# Patient Record
Sex: FEMALE | Race: BLACK OR AFRICAN AMERICAN | Employment: FULL TIME | URBAN - METROPOLITAN AREA
[De-identification: names, ages, dates, MRNs, and addresses within clinical notes are randomized per-mention and may not be internally consistent; named-entity substitution may affect disease eponyms.]

---

## 2018-04-02 ENCOUNTER — HOSPITAL ENCOUNTER (EMERGENCY)
Facility: HOSPITAL | Age: 53
Discharge: HOME | End: 2018-04-03
Attending: EMERGENCY MEDICINE
Payer: COMMERCIAL

## 2018-04-02 ENCOUNTER — APPOINTMENT (OUTPATIENT)
Dept: ULTRASOUND IMAGING | Facility: HOSPITAL | Age: 53
End: 2018-04-02
Attending: EMERGENCY MEDICINE
Payer: COMMERCIAL

## 2018-04-02 DIAGNOSIS — R55 VASOVAGAL REACTION: ICD-10-CM

## 2018-04-02 DIAGNOSIS — K80.50 BILIARY COLIC: Primary | ICD-10-CM

## 2018-04-02 PROCEDURE — 80053 COMPREHEN METABOLIC PANEL: CPT | Performed by: EMERGENCY MEDICINE

## 2018-04-02 PROCEDURE — A4216 STERILE WATER/SALINE, 10 ML: HCPCS | Performed by: EMERGENCY MEDICINE

## 2018-04-02 PROCEDURE — 96374 THER/PROPH/DIAG INJ IV PUSH: CPT

## 2018-04-02 PROCEDURE — C9113 INJ PANTOPRAZOLE SODIUM, VIA: HCPCS | Performed by: EMERGENCY MEDICINE

## 2018-04-02 PROCEDURE — 76705 ECHO EXAM OF ABDOMEN: CPT | Performed by: EMERGENCY MEDICINE

## 2018-04-02 PROCEDURE — 85025 COMPLETE CBC W/AUTO DIFF WBC: CPT | Performed by: EMERGENCY MEDICINE

## 2018-04-02 PROCEDURE — 96375 TX/PRO/DX INJ NEW DRUG ADDON: CPT

## 2018-04-02 PROCEDURE — 84484 ASSAY OF TROPONIN QUANT: CPT | Performed by: EMERGENCY MEDICINE

## 2018-04-02 PROCEDURE — 99285 EMERGENCY DEPT VISIT HI MDM: CPT

## 2018-04-02 PROCEDURE — 83690 ASSAY OF LIPASE: CPT | Performed by: EMERGENCY MEDICINE

## 2018-04-02 PROCEDURE — 93010 ELECTROCARDIOGRAM REPORT: CPT | Performed by: EMERGENCY MEDICINE

## 2018-04-02 PROCEDURE — 93005 ELECTROCARDIOGRAM TRACING: CPT

## 2018-04-02 RX ORDER — ONDANSETRON 4 MG/1
TABLET, ORALLY DISINTEGRATING ORAL
Status: DISCONTINUED
Start: 2018-04-02 | End: 2018-04-02 | Stop reason: WASHOUT

## 2018-04-02 RX ORDER — ONDANSETRON 4 MG/1
4 TABLET, ORALLY DISINTEGRATING ORAL EVERY 8 HOURS PRN
Qty: 15 TABLET | Refills: 0 | Status: SHIPPED | OUTPATIENT
Start: 2018-04-02 | End: 2018-04-07

## 2018-04-02 RX ORDER — ONDANSETRON 2 MG/ML
INJECTION INTRAMUSCULAR; INTRAVENOUS
Status: COMPLETED
Start: 2018-04-02 | End: 2018-04-02

## 2018-04-02 RX ORDER — ONDANSETRON 4 MG/1
4 TABLET, ORALLY DISINTEGRATING ORAL ONCE
Status: DISCONTINUED | OUTPATIENT
Start: 2018-04-02 | End: 2018-04-02

## 2018-04-02 RX ORDER — ONDANSETRON 2 MG/ML
4 INJECTION INTRAMUSCULAR; INTRAVENOUS ONCE
Status: COMPLETED | OUTPATIENT
Start: 2018-04-02 | End: 2018-04-02

## 2018-04-02 RX ORDER — TRAMADOL HYDROCHLORIDE 50 MG/1
50 TABLET ORAL EVERY 8 HOURS PRN
Qty: 15 TABLET | Refills: 0 | Status: SHIPPED | OUTPATIENT
Start: 2018-04-02 | End: 2018-04-07

## 2018-04-02 RX ORDER — PANTOPRAZOLE SODIUM 40 MG/1
40 TABLET, DELAYED RELEASE ORAL DAILY
Qty: 30 TABLET | Refills: 0 | Status: SHIPPED | OUTPATIENT
Start: 2018-04-02 | End: 2018-05-02

## 2018-04-03 VITALS
DIASTOLIC BLOOD PRESSURE: 77 MMHG | OXYGEN SATURATION: 95 % | SYSTOLIC BLOOD PRESSURE: 118 MMHG | HEIGHT: 69 IN | TEMPERATURE: 98 F | WEIGHT: 243 LBS | BODY MASS INDEX: 35.99 KG/M2 | RESPIRATION RATE: 18 BRPM | HEART RATE: 55 BPM

## 2018-04-03 NOTE — ED NOTES
EMS staff reports that they gave 0.5mg atropine in the field for sinus bradycardia of 52 with improvement noted. Field glucose of 114 reported.

## 2018-04-03 NOTE — ED INITIAL ASSESSMENT (HPI)
Patient arrived via EMS with complaint of nausea, weakness, lightheadedness, abdominal pain at home and slight shortness of breath. Patient currently denies pain, palpitations, or chest pain.

## 2018-04-03 NOTE — ED PROVIDER NOTES
Patient Seen in: [facility] AND [facility] Emergency Department    History   Patient presents with:  Fatigue (constitutional, neurologic)  Nausea/Vomiting/Diarrhea (gastrointestinal)    Stated Complaint: Nausea, weakness, sinus elisabeth     HPI    46 yo F with PMH rh 97.7 °F (36.5 °C) (Oral)   Resp 20   Ht 175.3 cm (5' 9\")   Wt 110.2 kg   LMP 04/02/2010 (Exact Date)   SpO2 100%   BMI 35.88 kg/m²         Physical Exam   Constitutional: No distress. HEENT: MMM. Head: Normocephalic. Cardiovascular: RRR.    Pulmonary/ gallbladder decompression. Consider biliary colic & chronic cholecystitis;  Surgery follow-up and/or HIDA scan may be of value, as needed. No biliary dilation. Fatty liver questioned. Probable gallbladder fundal adenomyomatosis incidentally noted. return explained with understanding voiced. 2347: Pain resolved with meds, smiling and resting in NAD. US findings explained with reasons for emergent return explained and understanding voiced.     Disposition and Plan     Clinical Impression:  Biliary c

## 2019-08-07 ENCOUNTER — HOSPITAL ENCOUNTER (OUTPATIENT)
Dept: GENERAL RADIOLOGY | Facility: HOSPITAL | Age: 54
Discharge: HOME | End: 2019-08-07
Attending: INTERNAL MEDICINE
Payer: COMMERCIAL

## 2019-08-07 ENCOUNTER — OFFICE VISIT (OUTPATIENT)
Dept: RHEUMATOLOGY | Facility: CLINIC | Age: 54
End: 2019-08-07
Payer: COMMERCIAL

## 2019-08-07 ENCOUNTER — LAB ENCOUNTER (OUTPATIENT)
Dept: LAB | Facility: HOSPITAL | Age: 54
End: 2019-08-07
Attending: INTERNAL MEDICINE
Payer: COMMERCIAL

## 2019-08-07 ENCOUNTER — TELEPHONE (OUTPATIENT)
Dept: RHEUMATOLOGY | Facility: CLINIC | Age: 54
End: 2019-08-07

## 2019-08-07 VITALS
WEIGHT: 244.5 LBS | BODY MASS INDEX: 35.4 KG/M2 | SYSTOLIC BLOOD PRESSURE: 153 MMHG | HEIGHT: 69.5 IN | HEART RATE: 60 BPM | DIASTOLIC BLOOD PRESSURE: 92 MMHG

## 2019-08-07 DIAGNOSIS — M19.90 INFLAMMATORY ARTHRITIS: ICD-10-CM

## 2019-08-07 DIAGNOSIS — M19.90 INFLAMMATORY ARTHRITIS: Primary | ICD-10-CM

## 2019-08-07 LAB
ALBUMIN SERPL-MCNC: 3.6 G/DL (ref 3.4–5)
ALBUMIN/GLOB SERPL: 0.9 {RATIO} (ref 1–2)
ALP LIVER SERPL-CCNC: 119 U/L (ref 41–108)
ALT SERPL-CCNC: 50 U/L (ref 13–56)
ANION GAP SERPL CALC-SCNC: 6 MMOL/L (ref 0–18)
AST SERPL-CCNC: 36 U/L (ref 15–37)
BILIRUB SERPL-MCNC: 0.5 MG/DL (ref 0.1–2)
BUN BLD-MCNC: 12 MG/DL (ref 7–18)
BUN/CREAT SERPL: 12.9 (ref 10–20)
CALCIUM BLD-MCNC: 9.2 MG/DL (ref 8.5–10.1)
CHLORIDE SERPL-SCNC: 110 MMOL/L (ref 98–112)
CHOLEST SMN-MCNC: 217 MG/DL (ref ?–200)
CO2 SERPL-SCNC: 28 MMOL/L (ref 21–32)
CREAT BLD-MCNC: 0.93 MG/DL (ref 0.55–1.02)
CRP SERPL-MCNC: 0.94 MG/DL (ref ?–0.3)
ERYTHROCYTE [SEDIMENTATION RATE] IN BLOOD: 24 MM/HR (ref 0–30)
GLOBULIN PLAS-MCNC: 4 G/DL (ref 2.8–4.4)
GLUCOSE BLD-MCNC: 95 MG/DL (ref 70–99)
HBV CORE AB SERPL QL IA: NONREACTIVE
HBV SURFACE AB SER QL: NONREACTIVE
HBV SURFACE AB SERPL IA-ACNC: 6.21 MIU/ML
HBV SURFACE AG SER-ACNC: <0.1 [IU]/L
HBV SURFACE AG SERPL QL IA: NONREACTIVE
HCV AB SERPL QL IA: NONREACTIVE
HDLC SERPL-MCNC: 44 MG/DL (ref 40–59)
LDLC SERPL CALC-MCNC: 149 MG/DL (ref ?–100)
M PROTEIN MFR SERPL ELPH: 7.6 G/DL (ref 6.4–8.2)
NONHDLC SERPL-MCNC: 173 MG/DL (ref ?–130)
OSMOLALITY SERPL CALC.SUM OF ELEC: 298 MOSM/KG (ref 275–295)
PATIENT FASTING: YES
PATIENT FASTING: YES
POTASSIUM SERPL-SCNC: 3.7 MMOL/L (ref 3.5–5.1)
RHEUMATOID FACT SERPL-ACNC: <10 IU/ML (ref ?–15)
SODIUM SERPL-SCNC: 144 MMOL/L (ref 136–145)
TRIGL SERPL-MCNC: 120 MG/DL (ref 30–149)
VLDLC SERPL CALC-MCNC: 24 MG/DL (ref 0–30)

## 2019-08-07 PROCEDURE — 80053 COMPREHEN METABOLIC PANEL: CPT | Performed by: INTERNAL MEDICINE

## 2019-08-07 PROCEDURE — 80061 LIPID PANEL: CPT

## 2019-08-07 PROCEDURE — 86706 HEP B SURFACE ANTIBODY: CPT | Performed by: INTERNAL MEDICINE

## 2019-08-07 PROCEDURE — 86140 C-REACTIVE PROTEIN: CPT | Performed by: INTERNAL MEDICINE

## 2019-08-07 PROCEDURE — 86235 NUCLEAR ANTIGEN ANTIBODY: CPT

## 2019-08-07 PROCEDURE — 85652 RBC SED RATE AUTOMATED: CPT | Performed by: INTERNAL MEDICINE

## 2019-08-07 PROCEDURE — 36415 COLL VENOUS BLD VENIPUNCTURE: CPT

## 2019-08-07 PROCEDURE — 86480 TB TEST CELL IMMUN MEASURE: CPT

## 2019-08-07 PROCEDURE — 86200 CCP ANTIBODY: CPT | Performed by: INTERNAL MEDICINE

## 2019-08-07 PROCEDURE — 86803 HEPATITIS C AB TEST: CPT | Performed by: INTERNAL MEDICINE

## 2019-08-07 PROCEDURE — 87340 HEPATITIS B SURFACE AG IA: CPT | Performed by: INTERNAL MEDICINE

## 2019-08-07 PROCEDURE — 99244 OFF/OP CNSLTJ NEW/EST MOD 40: CPT | Performed by: INTERNAL MEDICINE

## 2019-08-07 PROCEDURE — 86039 ANTINUCLEAR ANTIBODIES (ANA): CPT

## 2019-08-07 PROCEDURE — 73130 X-RAY EXAM OF HAND: CPT | Performed by: INTERNAL MEDICINE

## 2019-08-07 PROCEDURE — 82955 ASSAY OF G6PD ENZYME: CPT | Performed by: INTERNAL MEDICINE

## 2019-08-07 PROCEDURE — 86704 HEP B CORE ANTIBODY TOTAL: CPT | Performed by: INTERNAL MEDICINE

## 2019-08-07 PROCEDURE — 86038 ANTINUCLEAR ANTIBODIES: CPT

## 2019-08-07 PROCEDURE — 86431 RHEUMATOID FACTOR QUANT: CPT | Performed by: INTERNAL MEDICINE

## 2019-08-07 PROCEDURE — 86225 DNA ANTIBODY NATIVE: CPT

## 2019-08-07 RX ORDER — FUROSEMIDE 40 MG/1
40 TABLET ORAL AS NEEDED
COMMUNITY
Start: 2017-01-30 | End: 2021-03-01 | Stop reason: ALTCHOICE

## 2019-08-07 RX ORDER — DILTIAZEM HYDROCHLORIDE 180 MG/1
180 TABLET, EXTENDED RELEASE ORAL DAILY
Refills: 1 | Status: ON HOLD | COMMUNITY
Start: 2019-07-19 | End: 2021-02-23

## 2019-08-07 RX ORDER — HYDROCODONE BITARTRATE AND ACETAMINOPHEN 10; 325 MG/1; MG/1
1 TABLET ORAL EVERY 6 HOURS PRN
Status: ON HOLD | COMMUNITY
End: 2021-02-24

## 2019-08-07 RX ORDER — AMLODIPINE BESYLATE AND BENAZEPRIL HYDROCHLORIDE 10; 40 MG/1; MG/1
1 CAPSULE ORAL
Refills: 0 | COMMUNITY
Start: 2019-06-20

## 2019-08-07 RX ORDER — POTASSIUM CHLORIDE 20 MEQ/1
20 TABLET, EXTENDED RELEASE ORAL AS NEEDED
COMMUNITY

## 2019-08-07 RX ORDER — PAROXETINE 7.5 MG/1
20 CAPSULE ORAL DAILY
Refills: 0 | COMMUNITY
Start: 2019-07-19

## 2019-08-07 RX ORDER — METOPROLOL SUCCINATE 100 MG/1
50 TABLET, EXTENDED RELEASE ORAL 2 TIMES DAILY
Refills: 0 | COMMUNITY
Start: 2019-08-07

## 2019-08-07 RX ORDER — PREDNISONE 10 MG/1
TABLET ORAL
Qty: 60 TABLET | Refills: 0 | Status: ON HOLD | OUTPATIENT
Start: 2019-08-07 | End: 2019-10-08

## 2019-08-07 RX ORDER — CYCLOBENZAPRINE HCL 10 MG
10 TABLET ORAL AS NEEDED
Refills: 0 | COMMUNITY
Start: 2019-07-02

## 2019-08-07 NOTE — PATIENT INSTRUCTIONS
You were seen today for RA  Plan to start some steroids  - take prednisone 40 mg daily x3 days then 30 mg daily x3 days then 20 mg daily x3 days then 10 mg x3 days and then stay on 5 mg daily  - stop taking NSAID (advil, aleve or motrin)  - going to get yo

## 2019-08-07 NOTE — PROGRESS NOTES
Grupo Mora is a 52year old female who presents for Patient presents with:  Consult: establishing care; previous rheum was Dr. Palomo Adair   Rheumatoid Arthritis: diagnosed x20 years ago  Joint Pain: feet, ankles, knees, fingers, wrists and shoulders HCl 10-40 MG Oral Cap Take 1 capsule by mouth once daily. Disp:  Rfl: 0   Cyclobenzaprine HCl 10 MG Oral Tab Take 10 mg by mouth as needed. Disp:  Rfl: 0   MATZIM  MG Oral Tablet 24 Hr Take 180 mg by mouth daily.  Disp:  Rfl: 1   furosemide 40 MG Oral Right arm, Patient Position: Sitting, Cuff Size: large)   Pulse 60   Ht 5' 9.5\" (1.765 m)   Wt 244 lb 8 oz (110.9 kg)   BMI 35.59 kg/m²   GEN: AAOx3, NAD  HEENT: EOMI, PERRLA, no injection or icterus, oral mucosa moist, no oral lesions.  No lymphadenopathy daily    Thank you for allowing me to participate in this patients care.  Pt will f/u in 6 weeks    Ryan Carrasquillo MD  8/7/2019  10:45 AM

## 2019-08-08 LAB
GLUCOSE-6-PHOSPHATE DEHYDROGENASE: 12.7 U/G HB
NUCLEAR IGG TITR SER IF: POSITIVE {TITER}

## 2019-08-08 NOTE — TELEPHONE ENCOUNTER
Active 8/7/2019 Jose G Michaels 9/12/1965 Holly HASTINGS PA required pending n/a [place] N/[place] N/A N/A

## 2019-08-09 LAB
ANA NUCLEOLAR TITR SER IF: 320 {TITER}
CCP IGG SERPL-ACNC: 106 U/ML (ref 0–6.9)
DSDNA AB TITR SER: <10 {TITER}
M TB IFN-G CD4+ T-CELLS BLD-ACNC: 0.02 IU/ML
M TB TUBERC IFN-G BLD QL: NEGATIVE
M TB TUBERC IGNF/MITOGEN IGNF CONTROL: >10 IU/ML
QUANTIFERON TB1 MINUS NIL: 0 IU/ML
QUANTIFERON TB2 MINUS NIL: 0 IU/ML

## 2019-08-13 LAB
ENA SM IGG SER QL: NEGATIVE
ENA SM+RNP AB SER QL: NEGATIVE
ENA SS-A AB SER QL IA: NEGATIVE
ENA SS-B AB SER QL IA: NEGATIVE

## 2019-08-16 NOTE — TELEPHONE ENCOUNTER
Active 8/7/2019 Grupo Mora 9/12/1965 [facility] XR PA Pending pending 8/15: PA re-submitted with negative TB test results.  [place] N/A N/A

## 2019-09-18 ENCOUNTER — OFFICE VISIT (OUTPATIENT)
Dept: RHEUMATOLOGY | Facility: CLINIC | Age: 54
End: 2019-09-18
Payer: COMMERCIAL

## 2019-09-18 ENCOUNTER — TELEPHONE (OUTPATIENT)
Dept: RHEUMATOLOGY | Facility: CLINIC | Age: 54
End: 2019-09-18

## 2019-09-18 VITALS
HEART RATE: 56 BPM | BODY MASS INDEX: 36.05 KG/M2 | HEIGHT: 69.5 IN | DIASTOLIC BLOOD PRESSURE: 76 MMHG | WEIGHT: 249 LBS | SYSTOLIC BLOOD PRESSURE: 127 MMHG | RESPIRATION RATE: 16 BRPM

## 2019-09-18 DIAGNOSIS — M05.79 RHEUMATOID ARTHRITIS INVOLVING MULTIPLE SITES WITH POSITIVE RHEUMATOID FACTOR (HCC): Primary | ICD-10-CM

## 2019-09-18 DIAGNOSIS — Z51.81 MEDICATION MONITORING ENCOUNTER: ICD-10-CM

## 2019-09-18 PROCEDURE — 99214 OFFICE O/P EST MOD 30 MIN: CPT | Performed by: INTERNAL MEDICINE

## 2019-09-18 RX ORDER — METHYLPREDNISOLONE 4 MG/1
TABLET ORAL
Qty: 1 PACKAGE | Refills: 0 | Status: ON HOLD | OUTPATIENT
Start: 2019-09-18 | End: 2019-10-08

## 2019-09-18 NOTE — PROGRESS NOTES
Gabe Farley is a 62year old female. HPI:   Patient presents with: Follow - Up: inflammatory arthritis  Wrist Pain: Right      I had the pleasure of seeing Gabe Farley on 9/18/2019 for follow up Seropositive RA.      Current Medications:  Predni Cap Take 1 capsule by mouth once daily. Disp:  Rfl: 0   Cyclobenzaprine HCl 10 MG Oral Tab Take 10 mg by mouth as needed. Disp:  Rfl: 0   MATZIM  MG Oral Tablet 24 Hr Take 180 mg by mouth daily.  Disp:  Rfl: 1   furosemide 40 MG Oral Tab Take 40 mg by mood       LABS:     Component      Latest Ref Rng & Units 8/7/2019 4/2/2018   Glucose      70 - 99 mg/dL 95    Sodium      136 - 145 mmol/L 144    Potassium      3.5 - 5.1 mmol/L 3.7    Chloride      98 - 112 mmol/L 110    Carbon Dioxide, Total      21.0 Negative Negative   C-REACTIVE PROTEIN      <0.30 mg/dL 0.94 (H)   C-Citrullinated Peptide IgG AB      0.0 - 6.9 U/mL 106.0 (H)   SED RATE      0 - 30 mm/Hr 24   RHEUMATOID FACTOR      <15 IU/mL <10   STEVEN SCREEN WITH REFLEX (S)      Negative Positive (A)

## 2019-09-18 NOTE — TELEPHONE ENCOUNTER
Pt was seen in office today and stts she has not received Colby Miners. Contacted [facility], per pharmacist Leif Braun script was received 8/7/2019; however pharmacy was unsuccessful in contacting pt to set up delivery.  Updated pharmacy with pt's telephone numb

## 2019-09-18 NOTE — PATIENT INSTRUCTIONS
You were seen today for RA  Plan to get a hold of the pharamcy to see what the hold up on Manuel Baltazar was  Start Devivek Baltazar once you get it  If it gets bad can take the steroid pack  Blood work in 6 weeks

## 2019-10-07 ENCOUNTER — APPOINTMENT (OUTPATIENT)
Dept: CT IMAGING | Facility: HOSPITAL | Age: 54
DRG: 660 | End: 2019-10-07
Attending: EMERGENCY MEDICINE
Payer: COMMERCIAL

## 2019-10-07 ENCOUNTER — HOSPITAL ENCOUNTER (INPATIENT)
Facility: HOSPITAL | Age: 54
LOS: 2 days | Discharge: HOME | DRG: 660 | End: 2019-10-10
Attending: EMERGENCY MEDICINE | Admitting: INTERNAL MEDICINE
Payer: COMMERCIAL

## 2019-10-07 DIAGNOSIS — N23 RENAL COLIC: ICD-10-CM

## 2019-10-07 DIAGNOSIS — N20.1 URETEROLITHIASIS: Primary | ICD-10-CM

## 2019-10-07 PROCEDURE — 96361 HYDRATE IV INFUSION ADD-ON: CPT

## 2019-10-07 PROCEDURE — 81001 URINALYSIS AUTO W/SCOPE: CPT | Performed by: EMERGENCY MEDICINE

## 2019-10-07 PROCEDURE — 87086 URINE CULTURE/COLONY COUNT: CPT | Performed by: EMERGENCY MEDICINE

## 2019-10-07 PROCEDURE — 83690 ASSAY OF LIPASE: CPT | Performed by: EMERGENCY MEDICINE

## 2019-10-07 PROCEDURE — 96375 TX/PRO/DX INJ NEW DRUG ADDON: CPT

## 2019-10-07 PROCEDURE — 85025 COMPLETE CBC W/AUTO DIFF WBC: CPT | Performed by: EMERGENCY MEDICINE

## 2019-10-07 PROCEDURE — 99285 EMERGENCY DEPT VISIT HI MDM: CPT

## 2019-10-07 PROCEDURE — 96365 THER/PROPH/DIAG IV INF INIT: CPT

## 2019-10-07 PROCEDURE — 74177 CT ABD & PELVIS W/CONTRAST: CPT | Performed by: EMERGENCY MEDICINE

## 2019-10-07 PROCEDURE — 80076 HEPATIC FUNCTION PANEL: CPT | Performed by: EMERGENCY MEDICINE

## 2019-10-07 PROCEDURE — 80048 BASIC METABOLIC PNL TOTAL CA: CPT | Performed by: EMERGENCY MEDICINE

## 2019-10-07 RX ORDER — MORPHINE SULFATE 4 MG/ML
4 INJECTION, SOLUTION INTRAMUSCULAR; INTRAVENOUS ONCE
Status: COMPLETED | OUTPATIENT
Start: 2019-10-07 | End: 2019-10-07

## 2019-10-07 RX ORDER — ONDANSETRON 2 MG/ML
4 INJECTION INTRAMUSCULAR; INTRAVENOUS ONCE
Status: COMPLETED | OUTPATIENT
Start: 2019-10-07 | End: 2019-10-07

## 2019-10-08 ENCOUNTER — ANESTHESIA EVENT (OUTPATIENT)
Dept: SURGERY | Facility: HOSPITAL | Age: 54
DRG: 660 | End: 2019-10-08
Payer: COMMERCIAL

## 2019-10-08 ENCOUNTER — APPOINTMENT (OUTPATIENT)
Dept: GENERAL RADIOLOGY | Facility: HOSPITAL | Age: 54
DRG: 660 | End: 2019-10-08
Attending: UROLOGY
Payer: COMMERCIAL

## 2019-10-08 ENCOUNTER — ANESTHESIA (OUTPATIENT)
Dept: SURGERY | Facility: HOSPITAL | Age: 54
DRG: 660 | End: 2019-10-08
Payer: COMMERCIAL

## 2019-10-08 PROBLEM — N20.1 URETEROLITHIASIS: Status: ACTIVE | Noted: 2019-10-08

## 2019-10-08 PROBLEM — N23 RENAL COLIC: Status: ACTIVE | Noted: 2019-10-08

## 2019-10-08 PROCEDURE — 80048 BASIC METABOLIC PNL TOTAL CA: CPT | Performed by: INTERNAL MEDICINE

## 2019-10-08 PROCEDURE — 87040 BLOOD CULTURE FOR BACTERIA: CPT | Performed by: INTERNAL MEDICINE

## 2019-10-08 PROCEDURE — 0T768DZ DILATION OF RIGHT URETER WITH INTRALUMINAL DEVICE, VIA NATURAL OR ARTIFICIAL OPENING ENDOSCOPIC: ICD-10-PCS | Performed by: UROLOGY

## 2019-10-08 PROCEDURE — BT1D1ZZ FLUOROSCOPY OF RIGHT KIDNEY, URETER AND BLADDER USING LOW OSMOLAR CONTRAST: ICD-10-PCS | Performed by: UROLOGY

## 2019-10-08 PROCEDURE — 85025 COMPLETE CBC W/AUTO DIFF WBC: CPT | Performed by: INTERNAL MEDICINE

## 2019-10-08 DEVICE — STENT URET 6F 26CM WO GW INL: Type: IMPLANTABLE DEVICE | Site: URETER | Status: FUNCTIONAL

## 2019-10-08 RX ORDER — ACETAMINOPHEN 10 MG/ML
1000 INJECTION, SOLUTION INTRAVENOUS EVERY 6 HOURS PRN
Status: DISCONTINUED | OUTPATIENT
Start: 2019-10-08 | End: 2019-10-10

## 2019-10-08 RX ORDER — HYDROMORPHONE HYDROCHLORIDE 1 MG/ML
0.4 INJECTION, SOLUTION INTRAMUSCULAR; INTRAVENOUS; SUBCUTANEOUS EVERY 5 MIN PRN
Status: DISCONTINUED | OUTPATIENT
Start: 2019-10-08 | End: 2019-10-08 | Stop reason: HOSPADM

## 2019-10-08 RX ORDER — DEXAMETHASONE SODIUM PHOSPHATE 4 MG/ML
VIAL (ML) INJECTION AS NEEDED
Status: DISCONTINUED | OUTPATIENT
Start: 2019-10-08 | End: 2019-10-08 | Stop reason: SURG

## 2019-10-08 RX ORDER — LORAZEPAM 2 MG/ML
0.5 INJECTION INTRAMUSCULAR ONCE
Status: COMPLETED | OUTPATIENT
Start: 2019-10-08 | End: 2019-10-08

## 2019-10-08 RX ORDER — MORPHINE SULFATE 2 MG/ML
2 INJECTION, SOLUTION INTRAMUSCULAR; INTRAVENOUS EVERY 4 HOURS PRN
Status: DISCONTINUED | OUTPATIENT
Start: 2019-10-08 | End: 2019-10-10

## 2019-10-08 RX ORDER — HYDROMORPHONE HYDROCHLORIDE 1 MG/ML
0.6 INJECTION, SOLUTION INTRAMUSCULAR; INTRAVENOUS; SUBCUTANEOUS EVERY 5 MIN PRN
Status: DISCONTINUED | OUTPATIENT
Start: 2019-10-08 | End: 2019-10-08 | Stop reason: HOSPADM

## 2019-10-08 RX ORDER — MORPHINE SULFATE 4 MG/ML
4 INJECTION, SOLUTION INTRAMUSCULAR; INTRAVENOUS EVERY 10 MIN PRN
Status: DISCONTINUED | OUTPATIENT
Start: 2019-10-08 | End: 2019-10-08 | Stop reason: HOSPADM

## 2019-10-08 RX ORDER — METOCLOPRAMIDE HYDROCHLORIDE 5 MG/ML
5 INJECTION INTRAMUSCULAR; INTRAVENOUS ONCE
Status: COMPLETED | OUTPATIENT
Start: 2019-10-08 | End: 2019-10-08

## 2019-10-08 RX ORDER — METOCLOPRAMIDE HYDROCHLORIDE 5 MG/ML
10 INJECTION INTRAMUSCULAR; INTRAVENOUS EVERY 4 HOURS PRN
Status: DISCONTINUED | OUTPATIENT
Start: 2019-10-08 | End: 2019-10-10

## 2019-10-08 RX ORDER — SODIUM CHLORIDE, SODIUM LACTATE, POTASSIUM CHLORIDE, CALCIUM CHLORIDE 600; 310; 30; 20 MG/100ML; MG/100ML; MG/100ML; MG/100ML
INJECTION, SOLUTION INTRAVENOUS CONTINUOUS
Status: DISCONTINUED | OUTPATIENT
Start: 2019-10-08 | End: 2019-10-08 | Stop reason: HOSPADM

## 2019-10-08 RX ORDER — HYDROMORPHONE HYDROCHLORIDE 1 MG/ML
0.2 INJECTION, SOLUTION INTRAMUSCULAR; INTRAVENOUS; SUBCUTANEOUS EVERY 5 MIN PRN
Status: DISCONTINUED | OUTPATIENT
Start: 2019-10-08 | End: 2019-10-08 | Stop reason: HOSPADM

## 2019-10-08 RX ORDER — NALOXONE HYDROCHLORIDE 0.4 MG/ML
80 INJECTION, SOLUTION INTRAMUSCULAR; INTRAVENOUS; SUBCUTANEOUS AS NEEDED
Status: DISCONTINUED | OUTPATIENT
Start: 2019-10-08 | End: 2019-10-08 | Stop reason: HOSPADM

## 2019-10-08 RX ORDER — MORPHINE SULFATE 10 MG/ML
6 INJECTION, SOLUTION INTRAMUSCULAR; INTRAVENOUS EVERY 10 MIN PRN
Status: DISCONTINUED | OUTPATIENT
Start: 2019-10-08 | End: 2019-10-08 | Stop reason: HOSPADM

## 2019-10-08 RX ORDER — METOPROLOL TARTRATE 5 MG/5ML
2.5 INJECTION INTRAVENOUS ONCE
Status: DISCONTINUED | OUTPATIENT
Start: 2019-10-08 | End: 2019-10-08 | Stop reason: HOSPADM

## 2019-10-08 RX ORDER — SODIUM CHLORIDE 9 MG/ML
INJECTION, SOLUTION INTRAVENOUS CONTINUOUS
Status: DISCONTINUED | OUTPATIENT
Start: 2019-10-08 | End: 2019-10-08

## 2019-10-08 RX ORDER — LIDOCAINE HYDROCHLORIDE 10 MG/ML
INJECTION, SOLUTION EPIDURAL; INFILTRATION; INTRACAUDAL; PERINEURAL AS NEEDED
Status: DISCONTINUED | OUTPATIENT
Start: 2019-10-08 | End: 2019-10-08 | Stop reason: SURG

## 2019-10-08 RX ORDER — ONDANSETRON 2 MG/ML
4 INJECTION INTRAMUSCULAR; INTRAVENOUS EVERY 4 HOURS PRN
Status: DISCONTINUED | OUTPATIENT
Start: 2019-10-08 | End: 2019-10-10

## 2019-10-08 RX ORDER — SODIUM CHLORIDE, SODIUM LACTATE, POTASSIUM CHLORIDE, CALCIUM CHLORIDE 600; 310; 30; 20 MG/100ML; MG/100ML; MG/100ML; MG/100ML
INJECTION, SOLUTION INTRAVENOUS CONTINUOUS PRN
Status: DISCONTINUED | OUTPATIENT
Start: 2019-10-08 | End: 2019-10-08 | Stop reason: SURG

## 2019-10-08 RX ORDER — SODIUM CHLORIDE, SODIUM LACTATE, POTASSIUM CHLORIDE, CALCIUM CHLORIDE 600; 310; 30; 20 MG/100ML; MG/100ML; MG/100ML; MG/100ML
INJECTION, SOLUTION INTRAVENOUS ONCE
Status: COMPLETED | OUTPATIENT
Start: 2019-10-08 | End: 2019-10-08

## 2019-10-08 RX ORDER — ONDANSETRON 2 MG/ML
INJECTION INTRAMUSCULAR; INTRAVENOUS AS NEEDED
Status: DISCONTINUED | OUTPATIENT
Start: 2019-10-08 | End: 2019-10-08 | Stop reason: SURG

## 2019-10-08 RX ORDER — PROCHLORPERAZINE EDISYLATE 5 MG/ML
10 INJECTION INTRAMUSCULAR; INTRAVENOUS EVERY 6 HOURS PRN
Status: DISCONTINUED | OUTPATIENT
Start: 2019-10-08 | End: 2019-10-08

## 2019-10-08 RX ORDER — ONDANSETRON 2 MG/ML
4 INJECTION INTRAMUSCULAR; INTRAVENOUS ONCE AS NEEDED
Status: DISCONTINUED | OUTPATIENT
Start: 2019-10-08 | End: 2019-10-08 | Stop reason: HOSPADM

## 2019-10-08 RX ORDER — MORPHINE SULFATE 4 MG/ML
2 INJECTION, SOLUTION INTRAMUSCULAR; INTRAVENOUS EVERY 10 MIN PRN
Status: DISCONTINUED | OUTPATIENT
Start: 2019-10-08 | End: 2019-10-08 | Stop reason: HOSPADM

## 2019-10-08 RX ORDER — HYDROMORPHONE HYDROCHLORIDE 1 MG/ML
0.3 INJECTION, SOLUTION INTRAMUSCULAR; INTRAVENOUS; SUBCUTANEOUS EVERY 4 HOURS PRN
Status: DISCONTINUED | OUTPATIENT
Start: 2019-10-08 | End: 2019-10-10

## 2019-10-08 RX ADMIN — SODIUM CHLORIDE, SODIUM LACTATE, POTASSIUM CHLORIDE, CALCIUM CHLORIDE: 600; 310; 30; 20 INJECTION, SOLUTION INTRAVENOUS at 12:24:00

## 2019-10-08 RX ADMIN — SODIUM CHLORIDE, SODIUM LACTATE, POTASSIUM CHLORIDE, CALCIUM CHLORIDE: 600; 310; 30; 20 INJECTION, SOLUTION INTRAVENOUS at 11:49:00

## 2019-10-08 RX ADMIN — DEXAMETHASONE SODIUM PHOSPHATE 4 MG: 4 MG/ML VIAL (ML) INJECTION at 11:49:00

## 2019-10-08 RX ADMIN — ONDANSETRON 4 MG: 2 INJECTION INTRAMUSCULAR; INTRAVENOUS at 11:49:00

## 2019-10-08 RX ADMIN — LIDOCAINE HYDROCHLORIDE 50 MG: 10 INJECTION, SOLUTION EPIDURAL; INFILTRATION; INTRACAUDAL; PERINEURAL at 11:49:00

## 2019-10-08 NOTE — PLAN OF CARE
Problem: Patient Centered Care  Goal: Patient preferences are identified and integrated in the patient's plan of care  Description  Interventions:  - What would you like us to know as we care for you?  - Provide timely, complete, and accurate information

## 2019-10-08 NOTE — ANESTHESIA PROCEDURE NOTES
Airway  Date/Time: 10/8/2019 11:52 AM  Urgency: elective      General Information and Staff    Patient location during procedure: OR  Anesthesiologist: Kee Byers MD  Performed: anesthesiologist     Indications and Patient Condition  Indications for airw

## 2019-10-08 NOTE — ED NOTES
Orders for admission, patient is aware of plan and ready to go upstairs. Any questions, please call ED RN Hedy Camacho  at extension [phone]. Patient A&O x4, from home, independent ADLs.

## 2019-10-08 NOTE — ANESTHESIA POSTPROCEDURE EVALUATION
Patient: Lynn Hackett    Procedure Summary     Date:  10/08/19 Room / Location:  [address] [place] [address] MAIN OR 14 / [address] [place] [address] MAIN OR    Anesthesia Start:  5639 Anesthesia Stop:  1722    Procedure:  CYSTOSCOPY (Right ) Diagnosis:       Ureteral stone      (RIGHT URETERAL CALCU

## 2019-10-08 NOTE — H&P
[facility]    PATIENT'S NAME: Jocelin London PHYSICIAN: Carlie Vaughan MD   PATIENT ACCOUNT#:   [de-identified]    LOCATION:  [facility] PACU [address] [place] [address]  MEDICAL RECORD #:   [id number]       YOB: 1965  ADMISSION DATE: fluids and n.p.o.  Schedule for cystoscopy and stent placement. 2.   Sepsis secondary to urinary tract infection. Blood cultures were done. Continue IV antibiotics pending blood cultures and urine culture results.   3.   Hypertension, which is controlled

## 2019-10-08 NOTE — CONSULTS
[facility]    Report of Consultation    Brent Letters Patient Status:  Inpatient    1965 MRN [id number]   Location [facility] 5SW/SE Attending [address], [address] Day # 0 PCP Vivek Romero     Reason for Co (ROCEPHIN) 2 g in sodium chloride 0.9% 100 mL MBP/ADD-vantage, 2 g, Intravenous, Q24H  •  0.9% NaCl infusion, , Intravenous, Continuous  •  HYDROmorphone HCl (DILAUDID) 1 MG/ML injection 0.3 mg, 0.3 mg, Intravenous, Q4H PRN    Review of Systems:  Constitut with nausea in microscopic hematuria. .     TECHNIQUE:    CT images of the abdomen and pelvis were obtained with non-ionic intravenous contrast material.  Automated exposure control for dose reduction was used.  Adjustment of the mA and/or kV was done based the S2 level, and 1 just proximal to UVJ. 2. A 2 cm x 0.7 cm right lower pole renal calculus. 3. Colonic diverticulosis. 4. Small hiatal hernia. 5. Atherosclerotic vascular calcification without aneurysm.     Assessment/Plan:  RIGHT URETERAL CALCULI  RI

## 2019-10-08 NOTE — OPERATIVE REPORT
[facility]  Urology Procedure Note  Loise Stake Patient Status:  Inpatient    1965 MRN [id number]   Location [place] [address] Attending Jonathan Santamaria, [address] Day # 0 PCP Nova Skinner     Procedure: Cystosc None.    Taylor Wilburn  10/8/2019

## 2019-10-08 NOTE — ANESTHESIA PREPROCEDURE EVALUATION
Anesthesia PreOp Note    HPI:     Joyce Chen is a 62year old female who presents for preoperative consultation requested by: Randy Aggarwal MD    Date of Surgery: 10/7/2019 - 10/8/2019    Procedure(s):  CYSTOSCOPY  Indication: Ureteral stone [N20.1] (REGLAN) injection 10 mg 10 mg Intravenous Q4H PRN Jody Kirkland Junior, MD    [MAR Hold] morphINE sulfate (PF) 2 MG/ML injection 2 mg 2 mg Intravenous Q4H PRN Jody Kirkland Junior, MD 2 mg at 10/08/19 5184   cefTRIAXone Sodium (ROCEPHIN) 2 g in injection 80 mcg 80 mcg Intravenous PRN Josefina Anderson MD      No current [facility]-ordered outpatient medications on file. No Known Allergies    No family history on file.   Social History    Socioeconomic History      Marital status:       Spouse name: Value Date     10/08/2019    K 4.6 10/08/2019     10/08/2019    CO2 27.0 10/08/2019    BUN 15 10/08/2019    CREATSERUM 1.53 (H) 10/08/2019     (H) 10/08/2019    CA 8.5 10/08/2019          Vital Signs: Body mass index is 36.86 kg/m².    h

## 2019-10-08 NOTE — ED INITIAL ASSESSMENT (HPI)
C/O Pain to the Rt lower back associated with Nausea. No C/O fever/. No diarrhea.  No hematuria or dysuria

## 2019-10-08 NOTE — ED PROVIDER NOTES
Patient Seen in: [facility] AND [facility] Emergency Department      History   No chief complaint on file.     Stated Complaint: Back Pain    HPI    39-year-old female here with her daughter with complaints of right lower flank and right low back pain radiating t Normal range of motion. No midline thoracic or lumbar spine pain. Pain in the right lower flank and right low back area. No rash. Normal strength and sensation in both lower extremities per  Neurological: Alert and oriented to person, place, and time. Final result                 Please view results for these tests on the individual orders. URINALYSIS WITH CULTURE REFLEX   URINE CULTURE, ROUTINE     Contrast  enhanced CT of the abdomen/pelvis. Comparison: None.       IMPRESSION:    Approximately 4 x or action taken in relation to the contents of this report is strictly prohibited and may be unlawful.  If you have received this report in error, please notify the sender immediately at [phone] and permanently delete the original report and destroy an

## 2019-10-09 PROCEDURE — 80048 BASIC METABOLIC PNL TOTAL CA: CPT | Performed by: INTERNAL MEDICINE

## 2019-10-09 PROCEDURE — 85025 COMPLETE CBC W/AUTO DIFF WBC: CPT | Performed by: INTERNAL MEDICINE

## 2019-10-09 RX ORDER — ENOXAPARIN SODIUM 100 MG/ML
40 INJECTION SUBCUTANEOUS NIGHTLY
Status: DISCONTINUED | OUTPATIENT
Start: 2019-10-09 | End: 2019-10-10

## 2019-10-09 NOTE — PROGRESS NOTES
[place] [facility]    Progress Note    Tal Evans Patient Status:  Inpatient    1965 MRN [id number]   Location [facility] 5SW/SE Attending [address], [address] Day # 1 PCP Pastor Sebastián Skinner       Subjective:    Magaly Quesada 10/07/2019    ALT 39 10/07/2019     10/07/2019    ESRML 24 08/07/2019    CRP 0.94 (H) 08/07/2019    TROP 0.00 04/02/2018       Ct Abdomen Pelvis Iv Contrast, No Oral (er)    Result Date: 10/8/2019  CONCLUSION:  1.  Moderate right hydroureteronephrosi

## 2019-10-09 NOTE — PLAN OF CARE
Problem: Patient Centered Care  Goal: Patient preferences are identified and integrated in the patient's plan of care  Description  Interventions:  - What would you like us to know as we care for you? I want to pass my stone and go home.   - Provide timel Progressing  Note:   Patient had right ureteral stent placed today by Dr. Rossy Garcia with cystoscopy. Post procedure on unit patient has not complained of any pain. No stone passed during shift. Will continue to strain urine.   Temp - 101.4, tylenol given p

## 2019-10-09 NOTE — PLAN OF CARE
Plans to d/c to home with family once medically stable. States urinary symptoms improving. Urine strained, no kidney stones found. Up independently. IV Rocephin administered.    Problem: Patient Centered Care  Goal: Patient preferences are identified and in ADULT  Goal: Verbalizes/displays adequate comfort level or patient's stated pain goal  Description  INTERVENTIONS:  - Encourage pt to monitor pain and request assistance  - Assess pain using appropriate pain scale  - Administer analgesics based on type and

## 2019-10-09 NOTE — PROGRESS NOTES
[place] [facility] - [facility]    Progress Note    Carmelo Thao Patient Status:  Inpatient    1965 MRN [id number]   Location [facility] 5SW/SE Attending [address], [address] Day # 1 PCP Ladarius Skinner        Subjective:    Sg 10/8/2019  CONCLUSION:  1. Moderate right hydroureteronephrosis related to 2 distal ureteral calculi (1 at the S2 level, and 1 just proximal to UVJ. 2. A 2 cm x 0.7 cm right lower pole renal calculus. 3. Colonic diverticulosis. 4. Small hiatal hernia.  5. A

## 2019-10-10 VITALS
WEIGHT: 249.63 LBS | RESPIRATION RATE: 16 BRPM | DIASTOLIC BLOOD PRESSURE: 82 MMHG | SYSTOLIC BLOOD PRESSURE: 153 MMHG | HEIGHT: 69 IN | OXYGEN SATURATION: 98 % | HEART RATE: 78 BPM | TEMPERATURE: 99 F | BODY MASS INDEX: 36.97 KG/M2

## 2019-10-10 PROCEDURE — 80048 BASIC METABOLIC PNL TOTAL CA: CPT | Performed by: INTERNAL MEDICINE

## 2019-10-10 PROCEDURE — 85025 COMPLETE CBC W/AUTO DIFF WBC: CPT | Performed by: INTERNAL MEDICINE

## 2019-10-10 RX ORDER — ACETAMINOPHEN 325 MG/1
650 TABLET ORAL EVERY 6 HOURS PRN
Status: DISCONTINUED | OUTPATIENT
Start: 2019-10-10 | End: 2019-10-10

## 2019-10-10 RX ORDER — PHENAZOPYRIDINE HYDROCHLORIDE 200 MG/1
200 TABLET, FILM COATED ORAL 3 TIMES DAILY PRN
Status: DISCONTINUED | OUTPATIENT
Start: 2019-10-10 | End: 2019-10-10

## 2019-10-10 RX ORDER — POTASSIUM CHLORIDE 20 MEQ/1
40 TABLET, EXTENDED RELEASE ORAL EVERY 4 HOURS
Status: DISCONTINUED | OUTPATIENT
Start: 2019-10-10 | End: 2019-10-10

## 2019-10-10 RX ORDER — OXYBUTYNIN CHLORIDE 5 MG/1
5 TABLET ORAL 3 TIMES DAILY PRN
Qty: 30 TABLET | Refills: 0 | Status: ON HOLD | OUTPATIENT
Start: 2019-10-10 | End: 2021-02-24

## 2019-10-10 RX ORDER — PHENAZOPYRIDINE HYDROCHLORIDE 100 MG/1
100 TABLET, FILM COATED ORAL 3 TIMES DAILY PRN
Qty: 30 TABLET | Refills: 0 | Status: SHIPPED | OUTPATIENT
Start: 2019-10-10 | End: 2021-03-01 | Stop reason: ALTCHOICE

## 2019-10-10 RX ORDER — OXYBUTYNIN CHLORIDE 5 MG/1
5 TABLET ORAL 3 TIMES DAILY PRN
Status: DISCONTINUED | OUTPATIENT
Start: 2019-10-10 | End: 2019-10-10

## 2019-10-10 NOTE — PROGRESS NOTES
[place] [facility] - [facility]    Progress Note    Zuri Celaya Patient Status:  Inpatient    1965 MRN [id number]   Location [facility] 5SW/SE Attending [address], [address] Day # 2 PCP Christoph Skinner       Subjective:    Martir Linares 10/10/2019     10/10/2019    K 3.6 10/10/2019     10/10/2019    CO2 29.0 10/10/2019    GLU 88 10/10/2019    CA 8.6 10/10/2019    ALB 3.6 10/07/2019    ALKPHO 121 (H) 10/07/2019    BILT 0.4 10/07/2019    TP 7.6 10/07/2019    AST 15 10/07/2019

## 2019-10-10 NOTE — PROGRESS NOTES
[place] [facility] - [facility]    Progress Note    Jose G Michaels Patient Status:  Inpatient    1965 MRN [id number]   Location [facility] 5SW/SE Attending [address], [address] Day # 2 PCP Jodie Skinner     Subjective:   [address] fluoroscopic x-ray. Lasers are often used to treat any obstruction such as stone or stricture.  In most cases, the goal is complete removal of stone, occasionally this is not possible or indicated and in those cases there may be need for future procedures t

## 2019-10-10 NOTE — PLAN OF CARE
Problem: Patient Centered Care  Goal: Patient preferences are identified and integrated in the patient's plan of care  Description  Interventions:  - Provide timely, complete, and accurate information to patient/family  - Incorporate patient and family k social influences on pain and pain management  - Manage/alleviate anxiety  - Utilize distraction and/or relaxation techniques  - Monitor for opioid side effects  - Notify MD/LIP if interventions unsuccessful or patient reports new pain  - Anticipate increa

## 2019-10-10 NOTE — PAYOR COMM NOTE
--------------  DISCHARGE REVIEW    Payor: [address]  Subscriber #:  [id number]  Authorization Number: [id number]    Admit date: 10/8/19  Admit time:  4653  Discharge Date: 10/10/2019  1:01 PM     Admitting Physician: Maria Elena Acosta

## 2019-10-10 NOTE — PROGRESS NOTES
Patient is dc home. IV removed. Given strainer to strain urine. Pt aware to follow up with urology and pcp. Pt aware to  scripts from own pharmacy.

## 2019-10-10 NOTE — PAYOR COMM NOTE
--------------  ADMISSION REVIEW     Payor: [address]  Subscriber #:  [id number]  Authorization Number: [id number]    Admit date: 10/8/19  Admit time: [address]       Admitting Physician: Sharda Le MD  Attending Physician: Current:/[id number]   Pulse 78   Temp 98.4 °F (36.9 °C)   Resp 20   Ht 175.3 cm (5' 9\")   Wt 113.4 kg   SpO2 98%   BMI 36.92 kg/m²          Physical Exam    Constitutional: Oriented to person, place, and time. Appears well-developed. No distress.    Head: N All other components within normal limits   CBC W/ DIFFERENTIAL - Abnormal; Notable for the following components:    WBC 14.2 (*)     RDW-SD 48.6 (*)     Neutrophil Absolute Prelim 10.74 (*)     Neutrophil Absolute 10.74 (*)     Monocyte Absolute 1.04 (*) No evidence of small or large bowel obstruction. Moderate amount of stool. No evidence of colitis or diverticulitis. No free fluid within the abdomen or pelvis. No free intraperitoneal air. Portal vein, SMV, as well as the splenic vein appear patent. Patient still in pain here with intractable renal colic. CT shows evidence of an obstructing stone. Urine is overall clean but she does have a white count.   Spoke with Dr. Trevin Castle on-call for [facility] urology recommended antibiotics fluids n.p.o. status and the HISTORY OF PRESENT ILLNESS:  The patient is a 13-year-old female with a history of hypertension, rheumatoid arthritis, presented to the emergency room with the chief complaint of right flank and lower back pain radiating to the lower abdomen, which started 5.   History of rheumatoid arthritis.     Dictated By Jorge L Garcia MD  d: 10/08/2019 12:15:35  t: 10/08/2019 13:16:31  [facility] [id number]  /         Operative Report signed by Ashli Liu MD at 10/8/2019 12:11 PM     Author: Ashli Liu, Procedure: The patient was brought to the operating room where satisfactory general anesthesia was obtained. The airway was controlled with a laryngeal mask. A timeout was performed per protocol.  The patient was placed in lithotomy position and was prep Location [facility] 5SW/SE Attending [facility] Day # 1 PCP Mily Skinner         Subjective:    Merlene Rea is a(n) 62year old female with kidney stone   Feels better      Objective:   Blood pressure 150/74, pulse 94,   CRP 0.94 (H) 08/07/2019     TROP 0.00 04/02/2018         Ct Abdomen Pelvis Iv Contrast, No Oral (er)     Result Date: 10/8/2019  CONCLUSION:  1.  Moderate right hydroureteronephrosis related to 2 distal ureteral calculi (1 at the S2 level, and 1 just prox

## 2019-11-04 ENCOUNTER — TELEPHONE (OUTPATIENT)
Dept: RHEUMATOLOGY | Facility: CLINIC | Age: 54
End: 2019-11-04

## 2019-11-04 NOTE — TELEPHONE ENCOUNTER
Patient calling and states she need a refill for medication     Jennifer Wagner     The one she is own is a extended release pill she just hade surgery so she need the one for two times a day same medicine       Please advise   [facility]  [phone]

## 2019-11-26 NOTE — DISCHARGE SUMMARY
[facility]    PATIENT'S NAME: Jocelin London PHYSICIAN: Carlie Vaughan MD   PATIENT ACCOUNT#:   [id number]    LOCATION:  [address] [place] [address] RECORD #:   [id number]       YOB: 1965  ADMISSION DATE:       10/07/

## 2020-01-07 ENCOUNTER — HOSPITAL ENCOUNTER (EMERGENCY)
Facility: HOSPITAL | Age: 55
Discharge: HOME | End: 2020-01-07
Attending: EMERGENCY MEDICINE
Payer: COMMERCIAL

## 2020-01-07 ENCOUNTER — APPOINTMENT (OUTPATIENT)
Dept: GENERAL RADIOLOGY | Facility: HOSPITAL | Age: 55
End: 2020-01-07
Attending: EMERGENCY MEDICINE
Payer: COMMERCIAL

## 2020-01-07 VITALS
HEIGHT: 69 IN | DIASTOLIC BLOOD PRESSURE: 79 MMHG | RESPIRATION RATE: 18 BRPM | SYSTOLIC BLOOD PRESSURE: 127 MMHG | OXYGEN SATURATION: 98 % | WEIGHT: 208 LBS | BODY MASS INDEX: 30.81 KG/M2 | TEMPERATURE: 97 F | HEART RATE: 74 BPM

## 2020-01-07 DIAGNOSIS — J18.9 COMMUNITY ACQUIRED PNEUMONIA OF RIGHT UPPER LOBE OF LUNG: Primary | ICD-10-CM

## 2020-01-07 PROCEDURE — 71045 X-RAY EXAM CHEST 1 VIEW: CPT | Performed by: EMERGENCY MEDICINE

## 2020-01-07 PROCEDURE — 94640 AIRWAY INHALATION TREATMENT: CPT

## 2020-01-07 PROCEDURE — 99284 EMERGENCY DEPT VISIT MOD MDM: CPT

## 2020-01-07 RX ORDER — PREDNISONE 20 MG/1
60 TABLET ORAL ONCE
Status: COMPLETED | OUTPATIENT
Start: 2020-01-07 | End: 2020-01-07

## 2020-01-07 RX ORDER — LIDOCAINE HYDROCHLORIDE 20 MG/ML
INJECTION, SOLUTION EPIDURAL; INFILTRATION; INTRACAUDAL; PERINEURAL
Status: COMPLETED
Start: 2020-01-07 | End: 2020-01-07

## 2020-01-07 RX ORDER — MIDAZOLAM HYDROCHLORIDE 1 MG/ML
INJECTION INTRAMUSCULAR; INTRAVENOUS
Status: COMPLETED
Start: 2020-01-07 | End: 2020-01-07

## 2020-01-07 RX ORDER — AZITHROMYCIN 250 MG/1
TABLET, FILM COATED ORAL
Qty: 1 PACKAGE | Refills: 0 | Status: SHIPPED | OUTPATIENT
Start: 2020-01-07 | End: 2020-01-12

## 2020-01-07 RX ORDER — ALBUTEROL SULFATE 90 UG/1
2 AEROSOL, METERED RESPIRATORY (INHALATION) EVERY 4 HOURS PRN
Qty: 1 INHALER | Refills: 0 | Status: SHIPPED | OUTPATIENT
Start: 2020-01-07 | End: 2020-02-06

## 2020-01-07 RX ORDER — PREDNISONE 20 MG/1
40 TABLET ORAL DAILY
Qty: 6 TABLET | Refills: 0 | Status: SHIPPED | OUTPATIENT
Start: 2020-01-07 | End: 2020-01-10

## 2020-01-07 RX ORDER — IPRATROPIUM BROMIDE AND ALBUTEROL SULFATE 2.5; .5 MG/3ML; MG/3ML
3 SOLUTION RESPIRATORY (INHALATION) ONCE
Status: COMPLETED | OUTPATIENT
Start: 2020-01-07 | End: 2020-01-07

## 2020-01-07 RX ORDER — VERAPAMIL HYDROCHLORIDE 2.5 MG/ML
INJECTION, SOLUTION INTRAVENOUS
Status: COMPLETED
Start: 2020-01-07 | End: 2020-01-07

## 2020-01-07 NOTE — ED PROVIDER NOTES
Patient Seen in: [facility] AND [facility] Emergency Department    History   Patient presents with:  Cough/URI    Stated Complaint:     HPI    Patient here with cough, congestion for few days. No travel, no known sick contacts.   Patient denies sig shortness of Cyclobenzaprine HCl 10 MG Oral Tab,  Take 10 mg by mouth as needed. MATZIM  MG Oral Tablet 24 Hr,  Take 180 mg by mouth daily. furosemide 40 MG Oral Tab,  Take 40 mg by mouth as needed.    Metoprolol Succinate  MG Oral Tablet 24 Hr,  Take 10 rashes  Differential to include: URI vs. rhinonsinusitis vs. Bronchitis vs. Pneumonia         ED Course   Labs Reviewed - No data to display    MDM     Radiology:    Xr Chest Ap Portable  (cpt=71045)    Result Date: 1/7/2020  CONCLUSION:  1.  Small left upp

## 2020-01-07 NOTE — ED INITIAL ASSESSMENT (HPI)
Pt c/o being sick for 3 weeks. Worsening cough, lost her voice x2, body aches, chest aches with cough. Seen at [facility] on Saturday and given meds but feels they are not helping.

## 2020-01-07 NOTE — ED NOTES
Pt to ED per cough, congestion, sore throat, hoarse voice x2 weeks. Pt taking OTC meds without significant relief. LS coarse, expiratory wheeze. Congested cough. Rt at bedside.

## 2020-02-03 ENCOUNTER — HOSPITAL ENCOUNTER (OUTPATIENT)
Dept: GENERAL RADIOLOGY | Facility: HOSPITAL | Age: 55
Discharge: HOME | End: 2020-02-03
Attending: INTERNAL MEDICINE
Payer: COMMERCIAL

## 2020-02-03 DIAGNOSIS — J18.9 PNEUMONIA OF LEFT UPPER LOBE DUE TO INFECTIOUS ORGANISM: ICD-10-CM

## 2020-02-03 DIAGNOSIS — R05.9 COUGH: ICD-10-CM

## 2020-02-03 PROCEDURE — 71046 X-RAY EXAM CHEST 2 VIEWS: CPT | Performed by: INTERNAL MEDICINE

## 2020-09-27 ENCOUNTER — APPOINTMENT (OUTPATIENT)
Dept: GENERAL RADIOLOGY | Facility: HOSPITAL | Age: 55
End: 2020-09-27
Attending: EMERGENCY MEDICINE
Payer: COMMERCIAL

## 2020-09-27 PROCEDURE — 73560 X-RAY EXAM OF KNEE 1 OR 2: CPT | Performed by: EMERGENCY MEDICINE

## 2020-09-27 NOTE — ED PROVIDER NOTES
Patient Seen in: [facility] Emergency Department      History   Patient presents with: Anxiety/Panic attack    Stated Complaint: anxiety    HPI  History is provided by patient's .     78-year-old female with history of hypertension, arthrit Temp 98.7 °F (37.1 °C) (Temporal)   Resp 20   LMP 04/02/2010 (Exact Date)   SpO2 96%         Physical Exam  Vitals signs and nursing note reviewed. Constitutional:       Comments: Crying   HENT:      Head: Normocephalic.       Nose: Nose normal.      Chelsea within 1 week was provided. Medical Record Review: I personally reviewed available prior medical records for any recent pertinent discharge summaries, testing, and procedures, and reviewed those reports. Complicating Factors:  The patient already has

## 2020-09-27 NOTE — ED INITIAL ASSESSMENT (HPI)
Per family pt received news today of death in the family, and began hyperventilating and having anxiety attack. Pt arrives tearful, tachypneic but able to control breathing intermittently.    Hx from partner at bedside, pt currently not answering questions

## 2020-09-27 NOTE — ED NOTES
Pt somewhat more calm at this time, still stating she wants to go home and rest.     Now reports a fall earlier today, sts her family member fell and caused her to fall as a result, c/o bruise to R lower side of abd, and pain down R thigh to R knee.  Will n

## 2021-01-25 ENCOUNTER — LAB ENCOUNTER (OUTPATIENT)
Dept: LAB | Facility: HOSPITAL | Age: 56
End: 2021-01-25
Attending: INTERNAL MEDICINE
Payer: COMMERCIAL

## 2021-01-25 ENCOUNTER — HOSPITAL ENCOUNTER (OUTPATIENT)
Dept: GENERAL RADIOLOGY | Facility: HOSPITAL | Age: 56
Discharge: HOME | End: 2021-01-25
Attending: INTERNAL MEDICINE
Payer: COMMERCIAL

## 2021-01-25 ENCOUNTER — OFFICE VISIT (OUTPATIENT)
Dept: RHEUMATOLOGY | Facility: CLINIC | Age: 56
End: 2021-01-25
Payer: COMMERCIAL

## 2021-01-25 VITALS
HEIGHT: 69 IN | WEIGHT: 190 LBS | HEART RATE: 58 BPM | DIASTOLIC BLOOD PRESSURE: 84 MMHG | BODY MASS INDEX: 28.14 KG/M2 | SYSTOLIC BLOOD PRESSURE: 128 MMHG

## 2021-01-25 DIAGNOSIS — M05.79 RHEUMATOID ARTHRITIS INVOLVING MULTIPLE SITES WITH POSITIVE RHEUMATOID FACTOR (HCC): ICD-10-CM

## 2021-01-25 DIAGNOSIS — M54.42 ACUTE LEFT-SIDED LOW BACK PAIN WITH LEFT-SIDED SCIATICA: ICD-10-CM

## 2021-01-25 DIAGNOSIS — M25.531 RIGHT WRIST PAIN: ICD-10-CM

## 2021-01-25 DIAGNOSIS — M05.79 RHEUMATOID ARTHRITIS INVOLVING MULTIPLE SITES WITH POSITIVE RHEUMATOID FACTOR (HCC): Primary | ICD-10-CM

## 2021-01-25 LAB
ALBUMIN SERPL-MCNC: 3.5 G/DL (ref 3.4–5)
ALBUMIN/GLOB SERPL: 0.8 {RATIO} (ref 1–2)
ALP LIVER SERPL-CCNC: 106 U/L
ALT SERPL-CCNC: 16 U/L
ANION GAP SERPL CALC-SCNC: 4 MMOL/L (ref 0–18)
AST SERPL-CCNC: 10 U/L (ref 15–37)
BASOPHILS # BLD AUTO: 0.03 X10(3) UL (ref 0–0.2)
BASOPHILS NFR BLD AUTO: 0.6 %
BILIRUB SERPL-MCNC: 0.4 MG/DL (ref 0.1–2)
BUN BLD-MCNC: 19 MG/DL (ref 7–18)
BUN/CREAT SERPL: 17.1 (ref 10–20)
CALCIUM BLD-MCNC: 9.1 MG/DL (ref 8.5–10.1)
CHLORIDE SERPL-SCNC: 109 MMOL/L (ref 98–112)
CO2 SERPL-SCNC: 27 MMOL/L (ref 21–32)
CREAT BLD-MCNC: 1.11 MG/DL
CRP SERPL-MCNC: 0.76 MG/DL (ref ?–0.3)
DEPRECATED RDW RBC AUTO: 44.8 FL (ref 35.1–46.3)
EOSINOPHIL # BLD AUTO: 0.06 X10(3) UL (ref 0–0.7)
EOSINOPHIL NFR BLD AUTO: 1.1 %
ERYTHROCYTE [DISTWIDTH] IN BLOOD BY AUTOMATED COUNT: 13 % (ref 11–15)
ERYTHROCYTE [SEDIMENTATION RATE] IN BLOOD: 28 MM/HR
GLOBULIN PLAS-MCNC: 4.2 G/DL (ref 2.8–4.4)
GLUCOSE BLD-MCNC: 83 MG/DL (ref 70–99)
HBV CORE AB SERPL QL IA: NONREACTIVE
HBV SURFACE AB SER QL: NONREACTIVE
HBV SURFACE AB SERPL IA-ACNC: <3.1 MIU/ML
HBV SURFACE AG SER-ACNC: <0.1 [IU]/L
HBV SURFACE AG SERPL QL IA: NONREACTIVE
HCT VFR BLD AUTO: 39.2 %
HCV AB SERPL QL IA: NONREACTIVE
HGB BLD-MCNC: 12.9 G/DL
IMM GRANULOCYTES # BLD AUTO: 0.01 X10(3) UL (ref 0–1)
IMM GRANULOCYTES NFR BLD: 0.2 %
LYMPHOCYTES # BLD AUTO: 1.81 X10(3) UL (ref 1–4)
LYMPHOCYTES NFR BLD AUTO: 33.5 %
M PROTEIN MFR SERPL ELPH: 7.7 G/DL (ref 6.4–8.2)
MCH RBC QN AUTO: 30.9 PG (ref 26–34)
MCHC RBC AUTO-ENTMCNC: 32.9 G/DL (ref 31–37)
MCV RBC AUTO: 93.8 FL
MONOCYTES # BLD AUTO: 0.51 X10(3) UL (ref 0.1–1)
MONOCYTES NFR BLD AUTO: 9.4 %
NEUTROPHILS # BLD AUTO: 2.98 X10 (3) UL (ref 1.5–7.7)
NEUTROPHILS # BLD AUTO: 2.98 X10(3) UL (ref 1.5–7.7)
NEUTROPHILS NFR BLD AUTO: 55.2 %
OSMOLALITY SERPL CALC.SUM OF ELEC: 291 MOSM/KG (ref 275–295)
PATIENT FASTING Y/N/NP: NO
PLATELET # BLD AUTO: 267 10(3)UL (ref 150–450)
POTASSIUM SERPL-SCNC: 3.9 MMOL/L (ref 3.5–5.1)
RBC # BLD AUTO: 4.18 X10(6)UL
SODIUM SERPL-SCNC: 140 MMOL/L (ref 136–145)
WBC # BLD AUTO: 5.4 X10(3) UL (ref 4–11)

## 2021-01-25 PROCEDURE — 86706 HEP B SURFACE ANTIBODY: CPT | Performed by: INTERNAL MEDICINE

## 2021-01-25 PROCEDURE — 86480 TB TEST CELL IMMUN MEASURE: CPT

## 2021-01-25 PROCEDURE — 99214 OFFICE O/P EST MOD 30 MIN: CPT | Performed by: INTERNAL MEDICINE

## 2021-01-25 PROCEDURE — 86140 C-REACTIVE PROTEIN: CPT | Performed by: INTERNAL MEDICINE

## 2021-01-25 PROCEDURE — 86803 HEPATITIS C AB TEST: CPT | Performed by: INTERNAL MEDICINE

## 2021-01-25 PROCEDURE — 3008F BODY MASS INDEX DOCD: CPT | Performed by: INTERNAL MEDICINE

## 2021-01-25 PROCEDURE — 36415 COLL VENOUS BLD VENIPUNCTURE: CPT

## 2021-01-25 PROCEDURE — 87340 HEPATITIS B SURFACE AG IA: CPT | Performed by: INTERNAL MEDICINE

## 2021-01-25 PROCEDURE — 3074F SYST BP LT 130 MM HG: CPT | Performed by: INTERNAL MEDICINE

## 2021-01-25 PROCEDURE — 85025 COMPLETE CBC W/AUTO DIFF WBC: CPT | Performed by: INTERNAL MEDICINE

## 2021-01-25 PROCEDURE — 72110 X-RAY EXAM L-2 SPINE 4/>VWS: CPT | Performed by: INTERNAL MEDICINE

## 2021-01-25 PROCEDURE — 80053 COMPREHEN METABOLIC PANEL: CPT | Performed by: INTERNAL MEDICINE

## 2021-01-25 PROCEDURE — 3079F DIAST BP 80-89 MM HG: CPT | Performed by: INTERNAL MEDICINE

## 2021-01-25 PROCEDURE — 86704 HEP B CORE ANTIBODY TOTAL: CPT | Performed by: INTERNAL MEDICINE

## 2021-01-25 PROCEDURE — 85652 RBC SED RATE AUTOMATED: CPT | Performed by: INTERNAL MEDICINE

## 2021-01-25 RX ORDER — PREDNISONE 10 MG/1
TABLET ORAL
Qty: 30 TABLET | Refills: 0 | Status: SHIPPED | OUTPATIENT
Start: 2021-01-25 | End: 2021-03-01 | Stop reason: ALTCHOICE

## 2021-01-25 RX ORDER — GABAPENTIN 100 MG/1
CAPSULE ORAL
COMMUNITY
Start: 2020-10-24 | End: 2021-02-01

## 2021-01-25 NOTE — PATIENT INSTRUCTIONS
Were seen today for right wrist pain and swelling due to rheumatoid arthritis  You are also seen for lower back pain and left-sided sciatica pain  Plan to start prednisone 40 mg daily x3 days then 30 mg daily x3 days then 20 mg daily x3 days then 10 mg jade

## 2021-01-25 NOTE — PROGRESS NOTES
Donna Camacho is a 60year old female.     HPI:   Patient presents with:  Medication Follow-Up: Jew[facility]  Rheumatoid Arthritis  Wrist Pain  Back Pain: Tail bone pain that radiates to the hip, no injury  Hip Pain: Left hip      I had the pleasure of seeing is been on gabapentin 200 mg twice a day for the last year with minimal improvement        HISTORY:  Past Medical History:   Diagnosis Date   • Arthritis    • Essential hypertension       Social Hx Reviewed   Family Hx Reviewed     Medications (Active prio breathing  ABDOMEN:  soft NT/ND, +BS, no HSM  SKIN: No rashes or skin lesions.  No nail findings  MSK:  Cervical spine: FROM  Hands: R 2nd and 3rd MCP chronic synovitis, able to make a full fist b/l  Wrist: R wrist swelling, limited ROM due to pain  Elbow: Platelet Count      732 - 400 K/UL  211   MEAN PLATELET VOLUME      7.4 - 10.3 fL  10.1   Neutrophils %      %  56   Lymphocytes %      %  37   Monocytes %      %  6   Eosinophils %      %  1   Basophils %      %  1   Neutrophils Absolute      1.8 - 7.7 200 mg twice a day with minimal improvement, this was prescribed by her PCP  - Also tried Flexeril with minimal improvement  - Not able to take NSAIDs due to her history of bariatric surgery  - Will be placed on prednisone for RA, will likely help her scia

## 2021-01-26 ENCOUNTER — TELEPHONE (OUTPATIENT)
Dept: RHEUMATOLOGY | Facility: CLINIC | Age: 56
End: 2021-01-26

## 2021-01-27 LAB
M TB IFN-G CD4+ T-CELLS BLD-ACNC: 0.05 IU/ML
M TB TUBERC IFN-G BLD QL: NEGATIVE
M TB TUBERC IGNF/MITOGEN IGNF CONTROL: >10 IU/ML
QUANTIFERON TB1 MINUS NIL: 0 IU/ML
QUANTIFERON TB2 MINUS NIL: -0.01 IU/ML

## 2021-01-29 NOTE — TELEPHONE ENCOUNTER
Active 1/26/2021 Ronald Sanabria 9/12/1965 Eddie Rhodes XR Pending PA in process None.  [facility]

## 2021-02-01 ENCOUNTER — OFFICE VISIT (OUTPATIENT)
Dept: NEUROLOGY | Facility: CLINIC | Age: 56
End: 2021-02-01
Payer: COMMERCIAL

## 2021-02-01 ENCOUNTER — TELEPHONE (OUTPATIENT)
Dept: NEUROLOGY | Facility: CLINIC | Age: 56
End: 2021-02-01

## 2021-02-01 ENCOUNTER — TELEPHONE (OUTPATIENT)
Dept: PHYSICAL THERAPY | Facility: HOSPITAL | Age: 56
End: 2021-02-01

## 2021-02-01 VITALS
HEART RATE: 64 BPM | BODY MASS INDEX: 29.03 KG/M2 | DIASTOLIC BLOOD PRESSURE: 84 MMHG | HEIGHT: 69 IN | WEIGHT: 196 LBS | OXYGEN SATURATION: 95 % | SYSTOLIC BLOOD PRESSURE: 136 MMHG

## 2021-02-01 DIAGNOSIS — M54.16 ACUTE LEFT LUMBAR RADICULOPATHY: Primary | ICD-10-CM

## 2021-02-01 DIAGNOSIS — M47.816 FACET SYNDROME, LUMBAR: ICD-10-CM

## 2021-02-01 DIAGNOSIS — M05.79 RHEUMATOID ARTHRITIS INVOLVING MULTIPLE SITES WITH POSITIVE RHEUMATOID FACTOR (HCC): ICD-10-CM

## 2021-02-01 PROCEDURE — 99244 OFF/OP CNSLTJ NEW/EST MOD 40: CPT | Performed by: PHYSICAL MEDICINE & REHABILITATION

## 2021-02-01 PROCEDURE — 3075F SYST BP GE 130 - 139MM HG: CPT | Performed by: PHYSICAL MEDICINE & REHABILITATION

## 2021-02-01 PROCEDURE — 3008F BODY MASS INDEX DOCD: CPT | Performed by: PHYSICAL MEDICINE & REHABILITATION

## 2021-02-01 PROCEDURE — 3079F DIAST BP 80-89 MM HG: CPT | Performed by: PHYSICAL MEDICINE & REHABILITATION

## 2021-02-01 RX ORDER — GABAPENTIN 300 MG/1
CAPSULE ORAL
Qty: 90 CAPSULE | Refills: 0 | Status: SHIPPED | OUTPATIENT
Start: 2021-02-01

## 2021-02-01 NOTE — PROGRESS NOTES
[address]  NEW PATIENT EVALUATION    Consultation as a request of Dr. Colby Hernandez    Chief Complaint: back pain. HISTORY OF PRESENT ILLNESS:   Patient presents with:  New Patient: Lower back pain.  Patient state pain.    PAST MEDICAL HISTORY:     Past Medical History:   Diagnosis Date   • Arthritis    • Essential hypertension          PAST SURGICAL HISTORY:     Past Surgical History:   Procedure Laterality Date   • CYSTOSCOPY Right 10/8/2019    Performed by Marbin Mccall Oral Tab Take 1 tablet (5 mg total) by mouth 3 (three) times daily as needed (ureteral stent pain).  (Patient not taking: Reported on 1/25/2021 ) 30 tablet 0   • Phenazopyridine HCl (PYRIDIUM) 100 MG Oral Tab Take 1 tablet (100 mg total) by mouth 3 (three) LMP 04/02/2010 (Exact Date)   SpO2 95%   BMI 28.94 kg/m²   General: No immediate distress  Head: Normocephalic/ Atraumatic  Eyes: Extra-occular movements intact.    Ears: No auricular hematoma or deformities  Mouth: No lesions or ulcerations  Heart: periph 01/25/2021    GFRAA 64 01/25/2021    CA 9.1 01/25/2021    OSMOCALC 291 01/25/2021    ALKPHO 106 01/25/2021    AST 10 (L) 01/25/2021    ALT 16 01/25/2021    BILT 0.4 01/25/2021    TP 7.7 01/25/2021    ALB 3.5 01/25/2021    GLOBULIN 4.2 01/25/2021     were no barriers to learning. Elijah LY [address] [facility]  Physical Medicine and Rehabilitation/Sports Medicine

## 2021-02-01 NOTE — PATIENT INSTRUCTIONS
-Start physical therapy and home exercises  -Gabapentin 300mg three times/daily as tolerated  -Continue muscle relaxer as needed  -Finish Prednisone as ordered  -Ice/Heat as tolerated  -Please stop the medication if you have any side effects and call the o

## 2021-02-05 RX ORDER — TOFACITINIB 11 MG/1
11 TABLET, FILM COATED, EXTENDED RELEASE ORAL DAILY
Qty: 30 TABLET | Refills: 5 | OUTPATIENT
Start: 2021-02-05 | End: 2021-03-07

## 2021-02-22 ENCOUNTER — HOSPITAL ENCOUNTER (INPATIENT)
Facility: HOSPITAL | Age: 56
LOS: 2 days | Discharge: HOME | DRG: 690 | End: 2021-02-25
Attending: EMERGENCY MEDICINE | Admitting: INTERNAL MEDICINE
Payer: COMMERCIAL

## 2021-02-22 DIAGNOSIS — N20.0 STAGHORN CALCULUS: ICD-10-CM

## 2021-02-22 DIAGNOSIS — N12 PYELONEPHRITIS: Primary | ICD-10-CM

## 2021-02-22 LAB
ANION GAP SERPL CALC-SCNC: 5 MMOL/L (ref 0–18)
BASOPHILS # BLD AUTO: 0.02 X10(3) UL (ref 0–0.2)
BASOPHILS NFR BLD AUTO: 0.3 %
BUN BLD-MCNC: 18 MG/DL (ref 7–18)
BUN/CREAT SERPL: 14.8 (ref 10–20)
CALCIUM BLD-MCNC: 9.3 MG/DL (ref 8.5–10.1)
CHLORIDE SERPL-SCNC: 108 MMOL/L (ref 98–112)
CO2 SERPL-SCNC: 31 MMOL/L (ref 21–32)
CREAT BLD-MCNC: 1.22 MG/DL
DEPRECATED RDW RBC AUTO: 46.6 FL (ref 35.1–46.3)
EOSINOPHIL # BLD AUTO: 0.03 X10(3) UL (ref 0–0.7)
EOSINOPHIL NFR BLD AUTO: 0.4 %
ERYTHROCYTE [DISTWIDTH] IN BLOOD BY AUTOMATED COUNT: 13.4 % (ref 11–15)
GLUCOSE BLD-MCNC: 116 MG/DL (ref 70–99)
HCT VFR BLD AUTO: 38.7 %
HGB BLD-MCNC: 12.7 G/DL
IMM GRANULOCYTES # BLD AUTO: 0.01 X10(3) UL (ref 0–1)
IMM GRANULOCYTES NFR BLD: 0.1 %
LIPASE SERPL-CCNC: 104 U/L (ref 73–393)
LYMPHOCYTES # BLD AUTO: 1.08 X10(3) UL (ref 1–4)
LYMPHOCYTES NFR BLD AUTO: 14.3 %
MCH RBC QN AUTO: 31 PG (ref 26–34)
MCHC RBC AUTO-ENTMCNC: 32.8 G/DL (ref 31–37)
MCV RBC AUTO: 94.4 FL
MONOCYTES # BLD AUTO: 0.56 X10(3) UL (ref 0.1–1)
MONOCYTES NFR BLD AUTO: 7.4 %
NEUTROPHILS # BLD AUTO: 5.85 X10 (3) UL (ref 1.5–7.7)
NEUTROPHILS # BLD AUTO: 5.85 X10(3) UL (ref 1.5–7.7)
NEUTROPHILS NFR BLD AUTO: 77.5 %
OSMOLALITY SERPL CALC.SUM OF ELEC: 301 MOSM/KG (ref 275–295)
PLATELET # BLD AUTO: 251 10(3)UL (ref 150–450)
POTASSIUM SERPL-SCNC: 3.7 MMOL/L (ref 3.5–5.1)
RBC # BLD AUTO: 4.1 X10(6)UL
SODIUM SERPL-SCNC: 144 MMOL/L (ref 136–145)
WBC # BLD AUTO: 7.6 X10(3) UL (ref 4–11)

## 2021-02-22 PROCEDURE — 96365 THER/PROPH/DIAG IV INF INIT: CPT

## 2021-02-22 PROCEDURE — 80048 BASIC METABOLIC PNL TOTAL CA: CPT | Performed by: EMERGENCY MEDICINE

## 2021-02-22 PROCEDURE — 83690 ASSAY OF LIPASE: CPT | Performed by: EMERGENCY MEDICINE

## 2021-02-22 PROCEDURE — 96376 TX/PRO/DX INJ SAME DRUG ADON: CPT

## 2021-02-22 PROCEDURE — 96375 TX/PRO/DX INJ NEW DRUG ADDON: CPT

## 2021-02-22 PROCEDURE — 80076 HEPATIC FUNCTION PANEL: CPT | Performed by: EMERGENCY MEDICINE

## 2021-02-22 PROCEDURE — 99285 EMERGENCY DEPT VISIT HI MDM: CPT

## 2021-02-22 PROCEDURE — 85025 COMPLETE CBC W/AUTO DIFF WBC: CPT | Performed by: EMERGENCY MEDICINE

## 2021-02-22 PROCEDURE — 96361 HYDRATE IV INFUSION ADD-ON: CPT

## 2021-02-22 RX ORDER — MORPHINE SULFATE 4 MG/ML
4 INJECTION, SOLUTION INTRAMUSCULAR; INTRAVENOUS ONCE
Status: COMPLETED | OUTPATIENT
Start: 2021-02-22 | End: 2021-02-22

## 2021-02-22 RX ORDER — ONDANSETRON 2 MG/ML
4 INJECTION INTRAMUSCULAR; INTRAVENOUS ONCE
Status: COMPLETED | OUTPATIENT
Start: 2021-02-22 | End: 2021-02-22

## 2021-02-22 RX ORDER — MORPHINE SULFATE 4 MG/ML
4 INJECTION, SOLUTION INTRAMUSCULAR; INTRAVENOUS ONCE
Status: DISCONTINUED | OUTPATIENT
Start: 2021-02-22 | End: 2021-02-22

## 2021-02-22 RX ORDER — ONDANSETRON 2 MG/ML
4 INJECTION INTRAMUSCULAR; INTRAVENOUS ONCE
Status: DISCONTINUED | OUTPATIENT
Start: 2021-02-22 | End: 2021-02-22

## 2021-02-22 RX ORDER — KETOROLAC TROMETHAMINE 15 MG/ML
15 INJECTION, SOLUTION INTRAMUSCULAR; INTRAVENOUS ONCE
Status: COMPLETED | OUTPATIENT
Start: 2021-02-22 | End: 2021-02-22

## 2021-02-23 ENCOUNTER — APPOINTMENT (OUTPATIENT)
Dept: CT IMAGING | Facility: HOSPITAL | Age: 56
DRG: 690 | End: 2021-02-23
Attending: EMERGENCY MEDICINE
Payer: COMMERCIAL

## 2021-02-23 PROBLEM — N12 PYELONEPHRITIS: Status: ACTIVE | Noted: 2021-02-23

## 2021-02-23 PROBLEM — N20.0 STAGHORN CALCULUS: Status: ACTIVE | Noted: 2021-02-23

## 2021-02-23 LAB
ALBUMIN SERPL-MCNC: 3.5 G/DL (ref 3.4–5)
ALP LIVER SERPL-CCNC: 104 U/L
ALT SERPL-CCNC: 21 U/L
ANION GAP SERPL CALC-SCNC: 2 MMOL/L (ref 0–18)
AST SERPL-CCNC: 13 U/L (ref 15–37)
BILIRUB DIRECT SERPL-MCNC: <0.1 MG/DL (ref 0–0.2)
BILIRUB SERPL-MCNC: 0.3 MG/DL (ref 0.1–2)
BILIRUB UR QL: NEGATIVE
BUN BLD-MCNC: 12 MG/DL (ref 7–18)
BUN/CREAT SERPL: 13.8 (ref 10–20)
CALCIUM BLD-MCNC: 8.4 MG/DL (ref 8.5–10.1)
CHLORIDE SERPL-SCNC: 111 MMOL/L (ref 98–112)
CO2 SERPL-SCNC: 29 MMOL/L (ref 21–32)
COLOR UR: YELLOW
CREAT BLD-MCNC: 0.87 MG/DL
DEPRECATED RDW RBC AUTO: 48.3 FL (ref 35.1–46.3)
ERYTHROCYTE [DISTWIDTH] IN BLOOD BY AUTOMATED COUNT: 13.5 % (ref 11–15)
GLUCOSE BLD-MCNC: 90 MG/DL (ref 70–99)
GLUCOSE UR-MCNC: NEGATIVE MG/DL
HCT VFR BLD AUTO: 34.2 %
HGB BLD-MCNC: 11.1 G/DL
M PROTEIN MFR SERPL ELPH: 7.4 G/DL (ref 6.4–8.2)
MCH RBC QN AUTO: 31.3 PG (ref 26–34)
MCHC RBC AUTO-ENTMCNC: 32.5 G/DL (ref 31–37)
MCV RBC AUTO: 96.3 FL
NITRITE UR QL STRIP.AUTO: NEGATIVE
OSMOLALITY SERPL CALC.SUM OF ELEC: 293 MOSM/KG (ref 275–295)
PH UR: 7 [PH] (ref 5–8)
PLATELET # BLD AUTO: 222 10(3)UL (ref 150–450)
POTASSIUM SERPL-SCNC: 3.5 MMOL/L (ref 3.5–5.1)
PROT UR-MCNC: NEGATIVE MG/DL
RBC # BLD AUTO: 3.55 X10(6)UL
RBC #/AREA URNS AUTO: 5 /HPF
SARS-COV-2 RNA RESP QL NAA+PROBE: NOT DETECTED
SODIUM SERPL-SCNC: 142 MMOL/L (ref 136–145)
SP GR UR STRIP: 1.01 (ref 1–1.03)
UROBILINOGEN UR STRIP-ACNC: <2
WBC # BLD AUTO: 5.4 X10(3) UL (ref 4–11)
WBC #/AREA URNS AUTO: 119 /HPF

## 2021-02-23 PROCEDURE — 87186 SC STD MICRODIL/AGAR DIL: CPT | Performed by: EMERGENCY MEDICINE

## 2021-02-23 PROCEDURE — 74176 CT ABD & PELVIS W/O CONTRAST: CPT | Performed by: EMERGENCY MEDICINE

## 2021-02-23 PROCEDURE — 85027 COMPLETE CBC AUTOMATED: CPT | Performed by: PHYSICIAN ASSISTANT

## 2021-02-23 PROCEDURE — 87077 CULTURE AEROBIC IDENTIFY: CPT | Performed by: EMERGENCY MEDICINE

## 2021-02-23 PROCEDURE — 81001 URINALYSIS AUTO W/SCOPE: CPT | Performed by: EMERGENCY MEDICINE

## 2021-02-23 PROCEDURE — 80048 BASIC METABOLIC PNL TOTAL CA: CPT | Performed by: PHYSICIAN ASSISTANT

## 2021-02-23 PROCEDURE — 87086 URINE CULTURE/COLONY COUNT: CPT | Performed by: EMERGENCY MEDICINE

## 2021-02-23 RX ORDER — ONDANSETRON 2 MG/ML
4 INJECTION INTRAMUSCULAR; INTRAVENOUS EVERY 4 HOURS PRN
Status: DISCONTINUED | OUTPATIENT
Start: 2021-02-23 | End: 2021-02-25

## 2021-02-23 RX ORDER — SODIUM CHLORIDE 9 MG/ML
INJECTION, SOLUTION INTRAVENOUS CONTINUOUS
Status: ACTIVE | OUTPATIENT
Start: 2021-02-23 | End: 2021-02-23

## 2021-02-23 RX ORDER — ONDANSETRON 2 MG/ML
4 INJECTION INTRAMUSCULAR; INTRAVENOUS EVERY 6 HOURS PRN
Status: DISCONTINUED | OUTPATIENT
Start: 2021-02-23 | End: 2021-02-25

## 2021-02-23 RX ORDER — MORPHINE SULFATE 4 MG/ML
4 INJECTION, SOLUTION INTRAMUSCULAR; INTRAVENOUS ONCE
Status: COMPLETED | OUTPATIENT
Start: 2021-02-23 | End: 2021-02-23

## 2021-02-23 RX ORDER — POTASSIUM CHLORIDE 20 MEQ/1
20 TABLET, EXTENDED RELEASE ORAL DAILY PRN
Status: DISCONTINUED | OUTPATIENT
Start: 2021-02-23 | End: 2021-02-25

## 2021-02-23 RX ORDER — HYDROCODONE BITARTRATE AND ACETAMINOPHEN 10; 325 MG/1; MG/1
1 TABLET ORAL EVERY 6 HOURS PRN
Status: DISCONTINUED | OUTPATIENT
Start: 2021-02-23 | End: 2021-02-25

## 2021-02-23 RX ORDER — PAROXETINE HYDROCHLORIDE 20 MG/1
20 TABLET, FILM COATED ORAL DAILY
Status: DISCONTINUED | OUTPATIENT
Start: 2021-02-23 | End: 2021-02-25

## 2021-02-23 RX ORDER — SODIUM CHLORIDE 9 MG/ML
INJECTION, SOLUTION INTRAVENOUS CONTINUOUS
Status: DISCONTINUED | OUTPATIENT
Start: 2021-02-23 | End: 2021-02-24

## 2021-02-23 RX ORDER — FUROSEMIDE 40 MG/1
40 TABLET ORAL
Status: DISCONTINUED | OUTPATIENT
Start: 2021-02-23 | End: 2021-02-25

## 2021-02-23 RX ORDER — ENOXAPARIN SODIUM 100 MG/ML
40 INJECTION SUBCUTANEOUS DAILY
Status: DISCONTINUED | OUTPATIENT
Start: 2021-02-23 | End: 2021-02-25

## 2021-02-23 RX ORDER — ONDANSETRON 2 MG/ML
4 INJECTION INTRAMUSCULAR; INTRAVENOUS EVERY 4 HOURS PRN
Status: ACTIVE | OUTPATIENT
Start: 2021-02-23 | End: 2021-02-23

## 2021-02-23 RX ORDER — METOPROLOL TARTRATE 50 MG/1
50 TABLET, FILM COATED ORAL 2 TIMES DAILY
Status: DISCONTINUED | OUTPATIENT
Start: 2021-02-23 | End: 2021-02-25

## 2021-02-23 RX ORDER — AMLODIPINE BESYLATE AND BENAZEPRIL HYDROCHLORIDE 10; 40 MG/1; MG/1
1 CAPSULE ORAL
Status: DISCONTINUED | OUTPATIENT
Start: 2021-02-23 | End: 2021-02-23 | Stop reason: ALTCHOICE

## 2021-02-23 RX ORDER — ACETAMINOPHEN 325 MG/1
650 TABLET ORAL EVERY 6 HOURS PRN
Status: DISCONTINUED | OUTPATIENT
Start: 2021-02-23 | End: 2021-02-25

## 2021-02-23 RX ORDER — GABAPENTIN 300 MG/1
300 CAPSULE ORAL NIGHTLY
Status: DISCONTINUED | OUTPATIENT
Start: 2021-02-23 | End: 2021-02-25

## 2021-02-23 RX ORDER — CYCLOBENZAPRINE HCL 10 MG
10 TABLET ORAL 3 TIMES DAILY PRN
Status: DISCONTINUED | OUTPATIENT
Start: 2021-02-23 | End: 2021-02-25

## 2021-02-23 NOTE — CONSULTS
[facility]    Report of Consultation    Cyn Vazquez Patient Status:  Inpatient    1965 MRN [id number]   Location [facility] 5SW/SE Attending [address], [address] [facility] Day # 0 PCP Boaz Blevins MD     Re HOLMIUM  LITHOTRIPSY LASER WITH CYSTOSCOPY Right 10/25/2019    Performed by Josiah Barreto MD at [facility]   • TOTAL ABDOM HYSTERECTOMY      Partial 2010     History reviewed. No pertinent family history.    reports that she has never smoked abnormality, atraumatic  Eyes: conjunctivae and sclerae normal  Ears: normal hearing  Lungs: symmetrical chest wall expansion, non-labored respirations  Abdomen: soft, non-tender, non-distended  : no CVAT  MSK: moves all extremities equally, no deformiti appears to have been supracervical hysterectomy. Normal appendix. Colonic diverticulosis. Changes of gastric fundal diverticulum resection/sleeve gastrectomy. Small hiatus hernia.     Assessment/Plan:  RIGHT RENAL CALCULUS  2 cm right renal pelvis calcu

## 2021-02-23 NOTE — ED INITIAL ASSESSMENT (HPI)
Pt states for the last few hours she has been having lower back pain, n/v. She took a muscle relaxer at home and began to have nausea.  Has a history of gallstones and states she is having similar symptoms

## 2021-02-23 NOTE — PHYSICAL THERAPY NOTE
MD order received, chart reviewed. Spoke with RN who reports patient is up ad brian in room. Spoke with patient who reports she is moving well and has no concerns about return home. Order completed, NO PT evaluation performed.  Educated patient to be up to KS12

## 2021-02-23 NOTE — PLAN OF CARE
Pt admitted and oriented to unit. Med rec completed. Pt alert and oriented x4, on room air. Pt did not complain of pain after transfer from ED. Some nausea when she arrived to unit, which went away with rest. IV fluids running. Pt NPO. Urology on consult. Evaluate fluid balance  Outcome: Progressing     Problem: GENITOURINARY - ADULT  Goal: Absence of urinary retention  Description: INTERVENTIONS:  - Assess patient’s ability to void and empty bladder  - Monitor intake/output and perform bladder scan as need guidelines  Outcome: Progressing     Problem: SAFETY ADULT - FALL  Goal: Free from fall injury  Description: INTERVENTIONS:  - Assess pt frequently for physical needs  - Identify cognitive and physical deficits and behaviors that affect risk of falls.   - I

## 2021-02-23 NOTE — ED PROVIDER NOTES
Patient Seen in: [facility] AND [facility] Emergency Department    History   Patient presents with:  Abdomen/Flank Pain      HPI    The patient presents to the ED complaining of lower back pain on the right side for the past few hours.   Took a muscle relaxer in Family History elements reviewed from today, pertinent positives to the presenting problem noted.     Physical Exam     ED Triage Vitals [02/22/21 2206]   /83   Pulse 86   Resp 18   Temp 98.3 °F (36.8 °C)   Temp src Temporal   SpO2 96 %   O2 Device No normal limits   BASIC METABOLIC PANEL (8) - Abnormal; Notable for the following components:    Glucose 116 (*)     Creatinine 1.22 (*)     Calculated Osmolality 301 (*)     GFR, Non- 49 (*)     GFR, -American 57 (*)     All other com right renal pelvis measuring up to 12 mm short axis diameter and 2.4 cm in length. There is new mild perinephric stranding, more on the right kidney lower pole.   The right ureter is dilated down to the right ureterovesical junction but there is no obstruc reviewed available prior medical records for any recent pertinent discharge summaries, testing, and procedures and reviewed those reports. Complicating Factors: The patient already has does not have any pertinent problems on file.  to contribute to the c

## 2021-02-23 NOTE — ED NOTES
Orders for admission. Patient and/or next of kin aware of plan and is ready to go upstairs. Please call ED RN Evi Desir at listed extension should you have any further questions or concerns. Thank you.     Nurse and Duke Villareal RN, [phone]    Chief Presentation: ABD

## 2021-02-23 NOTE — PLAN OF CARE
Patient having pain in lower back. Norco given. General diet and tolerating meals. Independent and continent. Possible discharge tomorrow and follow up with urology for uretal stent placement.   Problem: Patient Centered Care  Goal: Patient preferences are retention  Description: INTERVENTIONS:  - Assess patient’s ability to void and empty bladder  - Monitor intake/output and perform bladder scan as needed  - Follow urinary retention protocol/standard of care  - Consider collaborating with pharmacy to review Anticipate increased pain with activity and pre-medicate as appropriate  Outcome: Progressing     Problem: RISK FOR INFECTION - ADULT  Goal: Absence of fever/infection during anticipated neutropenic period  Description: INTERVENTIONS  - Monitor WBC  - Admi

## 2021-02-23 NOTE — OCCUPATIONAL THERAPY NOTE
OT orders received, chart reviewed. PT Opal Leon spoke with RN earlier and pt is up ad brian now. PT spoke with pt as well, and pt reports she is moving well and there are no concerns at the moment to return home.  Will D/C OT orders at this time, please re-order

## 2021-02-24 RX ORDER — HYDROCODONE BITARTRATE AND ACETAMINOPHEN 10; 325 MG/1; MG/1
1 TABLET ORAL EVERY 6 HOURS PRN
Qty: 12 TABLET | Refills: 0 | Status: SHIPPED | OUTPATIENT
Start: 2021-02-24

## 2021-02-24 NOTE — PROGRESS NOTES
[facility]    Progress Note    Jesi Ends Patient Status:  Inpatient    1965 MRN [id number]   Location [facility] 5SW/SE Attending [address], [address] Day # 1 PCP Earnestine Bliss MD         Subject 04/02/2018       Ct Abdomen+pelvis Kidneystone 2d Rndr(no Iv,no Oral)(cpt=74176)    Result Date: 2/23/2021  CONCLUSION:  1.  Large staghorn calculus in the right kidney measuring 2.2 x 1.7 cm with moderate dilatation of the right renal pelvis, and mild dila

## 2021-02-24 NOTE — PLAN OF CARE
Problem: Patient Centered Care  Goal: Patient preferences are identified and integrated in the patient's plan of care  Description: Interventions:  - What would you like us to know as we care for you?  I live at home with my   - Provide timely, com care  - Consider collaborating with pharmacy to review patient's medication profile  - Implement strategies to promote bladder emptying  Outcome: Progressing     Problem: METABOLIC/FLUID AND ELECTROLYTES - ADULT  Goal: Glucose maintained within prescribed period  Description: INTERVENTIONS  - Monitor WBC  - Administer growth factors as ordered  - Implement neutropenic guidelines  Outcome: Progressing     Problem: SAFETY ADULT - FALL  Goal: Free from fall injury  Description: INTERVENTIONS:  - Assess pt freq

## 2021-02-24 NOTE — H&P
[facility]    PATIENT'S NAME: Venetta Age PHYSICIAN: Dianne Glass MD   PATIENT ACCOUNT#:   [id number]    LOCATION:  [address] [place] [address] RECORD #:   [id number]       YOB: 1965  ADMISSION DATE:       02/22/ results. 2.   Flank pain with obstructive uropathy. CT of the abdomen showed a large staghorn calculus measuring up to 12 mm in diameter and 2.4 cm in length. Urology consultation noted. Continue with IV fluids. 3.   Hypertension which is controlled.

## 2021-02-24 NOTE — PLAN OF CARE
Patient staying overnight awaiting culture results. Outpatient urology appointment scheduled for Monday, no intervention planned for inpatient. Patient independent and tolerating general diet.   Problem: Patient Centered Care  Goal: Patient preferences are retention  Description: INTERVENTIONS:  - Assess patient’s ability to void and empty bladder  - Monitor intake/output and perform bladder scan as needed  - Follow urinary retention protocol/standard of care  - Consider collaborating with pharmacy to review Anticipate increased pain with activity and pre-medicate as appropriate  Outcome: Progressing     Problem: RISK FOR INFECTION - ADULT  Goal: Absence of fever/infection during anticipated neutropenic period  Description: INTERVENTIONS  - Monitor WBC  - Admi

## 2021-02-24 NOTE — PROGRESS NOTES
[facility]    Progress Note    Elias Stack Patient Status:  Inpatient    1965 MRN [id number]   Location [facility] 5SW/SE Attending [address], [address] [facility] Day # 1 PCP MD Domitila Rodriguez Enoxaparin Sodium (LOVENOX) 40 MG/0.4ML injection 40 mg, 40 mg, Subcutaneous, Daily    •  ondansetron HCl (ZOFRAN) injection 4 mg, 4 mg, Intravenous, Q4H PRN          Results:     Lab Results   Component Value Date    WBC 5.4 02/23/2021    HGB 11.1 (L) 02/ by (CST): Wally Prieto MD on 2/23/2021 at 8:36 AM                    Assessment and Plan:     Pyelonephritis  continue Iv abx   Urine cx grew Proteus       Staghorn calculus  Encourage po fluid intake   Intervention by urology as outpt     HTN controlled

## 2021-02-25 VITALS
RESPIRATION RATE: 18 BRPM | HEIGHT: 69.5 IN | WEIGHT: 189.63 LBS | SYSTOLIC BLOOD PRESSURE: 115 MMHG | OXYGEN SATURATION: 93 % | BODY MASS INDEX: 27.45 KG/M2 | TEMPERATURE: 99 F | DIASTOLIC BLOOD PRESSURE: 67 MMHG | HEART RATE: 60 BPM

## 2021-02-25 LAB
ANION GAP SERPL CALC-SCNC: 2 MMOL/L (ref 0–18)
BUN BLD-MCNC: 14 MG/DL (ref 7–18)
BUN/CREAT SERPL: 11.6 (ref 10–20)
CALCIUM BLD-MCNC: 8.5 MG/DL (ref 8.5–10.1)
CHLORIDE SERPL-SCNC: 109 MMOL/L (ref 98–112)
CO2 SERPL-SCNC: 30 MMOL/L (ref 21–32)
CREAT BLD-MCNC: 1.21 MG/DL
DEPRECATED RDW RBC AUTO: 47.3 FL (ref 35.1–46.3)
ERYTHROCYTE [DISTWIDTH] IN BLOOD BY AUTOMATED COUNT: 13.3 % (ref 11–15)
GLUCOSE BLD-MCNC: 92 MG/DL (ref 70–99)
HCT VFR BLD AUTO: 33.2 %
HGB BLD-MCNC: 10.6 G/DL
MCH RBC QN AUTO: 30.7 PG (ref 26–34)
MCHC RBC AUTO-ENTMCNC: 31.9 G/DL (ref 31–37)
MCV RBC AUTO: 96.2 FL
OSMOLALITY SERPL CALC.SUM OF ELEC: 292 MOSM/KG (ref 275–295)
PLATELET # BLD AUTO: 216 10(3)UL (ref 150–450)
POTASSIUM SERPL-SCNC: 3.9 MMOL/L (ref 3.5–5.1)
RBC # BLD AUTO: 3.45 X10(6)UL
SODIUM SERPL-SCNC: 141 MMOL/L (ref 136–145)
WBC # BLD AUTO: 7.2 X10(3) UL (ref 4–11)

## 2021-02-25 PROCEDURE — 85027 COMPLETE CBC AUTOMATED: CPT | Performed by: PHYSICIAN ASSISTANT

## 2021-02-25 PROCEDURE — 80048 BASIC METABOLIC PNL TOTAL CA: CPT | Performed by: PHYSICIAN ASSISTANT

## 2021-02-25 RX ORDER — CEFADROXIL 500 MG/1
500 CAPSULE ORAL 2 TIMES DAILY
Qty: 14 CAPSULE | Refills: 0 | Status: SHIPPED | OUTPATIENT
Start: 2021-02-25 | End: 2021-03-04

## 2021-02-25 RX ORDER — TRAMADOL HYDROCHLORIDE 50 MG/1
50 TABLET ORAL EVERY 6 HOURS PRN
Status: DISCONTINUED | OUTPATIENT
Start: 2021-02-25 | End: 2021-02-25

## 2021-02-25 NOTE — PROGRESS NOTES
Discharge RN Summary: Patient has discharge order in. Patient to discharge home. IV removed by this RN. Understands to follow up with PCP in 1 week.  Patient understands to  script for cefadroxil from pharmacy and bring in script for norco to be aflredo

## 2021-02-25 NOTE — PROGRESS NOTES
Progress Note    Poncho Ades Patient Status:  Inpatient    1965 MRN [id number]   Location [facility] 5SW/SE Attending [address], [address] Day # 2 PCP Sarah Do MD     Subjective: Follow up stone/UTI.   Kirstin pain, which is typically more isolated to one side/flank or abdomen  Currently taking Norco  mg q 6  Med interaction between NSAIDs/paroxetine  Will try Tramadol 50 mg q 6 hours prn instead     Patient should follow up with urology Monday as schedule

## 2021-02-25 NOTE — PROGRESS NOTES
[facility]    Progress Note    Merlene Rea Patient Status:  Inpatient    1965 MRN [id number]   Location [facility] 5SW/SE Attending [address], [address] [facility] Day # 2 PCP MD Alesha Howell (LOVENOX) 40 MG/0.4ML injection 40 mg, 40 mg, Subcutaneous, Daily    •  ondansetron HCl (ZOFRAN) injection 4 mg, 4 mg, Intravenous, Q4H PRN          Results:     Lab Results   Component Value Date    WBC 7.2 02/25/2021    HGB 10.6 (L) 02/25/2021    HCT 33.

## 2021-04-05 NOTE — DISCHARGE SUMMARY
[facility]    Discharge Summary    Carmelo Thao Patient Status:  Inpatient    1965 MRN [id number]   Location [facility] 5SW/SE Attending No att. providers found   [facility] Day # 2 PCP Kandace Berry MD     Date of A List as of 2/25/2021  2:26 PM    New Orders    Cefadroxil 500 MG Oral Cap  Take 1 capsule (500 mg total) by mouth 2 (two) times daily for 7 days. , Normal, Disp-14 capsule, R-0      Home Meds - Modified    HYDROcodone-acetaminophen  MG Oral Tab  Take Missael Hamilton MD In 1 week. Specialty: Internal Medicine  Contact information:  [address]  [phone]                   Follow up Labs: as ordered.         Joy RIVERS  4/4/2021

## 2021-04-17 ENCOUNTER — LAB ENCOUNTER (OUTPATIENT)
Dept: LAB | Facility: HOSPITAL | Age: 56
End: 2021-04-17
Attending: INTERNAL MEDICINE
Payer: COMMERCIAL

## 2021-04-17 DIAGNOSIS — Z01.818 PRE-OP TESTING: ICD-10-CM

## 2021-04-17 DIAGNOSIS — Z01.818 PREOP TESTING: Primary | ICD-10-CM

## 2021-04-17 PROCEDURE — 93005 ELECTROCARDIOGRAM TRACING: CPT

## 2021-04-17 PROCEDURE — 36415 COLL VENOUS BLD VENIPUNCTURE: CPT

## 2021-04-17 PROCEDURE — 80053 COMPREHEN METABOLIC PANEL: CPT

## 2021-04-17 PROCEDURE — 93010 ELECTROCARDIOGRAM REPORT: CPT | Performed by: UROLOGY

## 2021-05-02 ENCOUNTER — IMMUNIZATION (OUTPATIENT)
Dept: LAB | Facility: HOSPITAL | Age: 56
End: 2021-05-02
Attending: EMERGENCY MEDICINE
Payer: COMMERCIAL

## 2021-05-02 DIAGNOSIS — Z23 NEED FOR VACCINATION: Primary | ICD-10-CM

## 2021-05-02 PROCEDURE — 0011A SARSCOV2 VAC 100MCG/0.5ML IM: CPT

## 2021-05-13 ENCOUNTER — LAB REQUISITION (OUTPATIENT)
Dept: LAB | Facility: HOSPITAL | Age: 56
End: 2021-05-13
Payer: COMMERCIAL

## 2021-05-13 DIAGNOSIS — N20.0 CALCULUS OF KIDNEY: ICD-10-CM

## 2021-05-13 PROCEDURE — 88300 SURGICAL PATH GROSS: CPT | Performed by: UROLOGY

## 2021-05-13 PROCEDURE — 82365 CALCULUS SPECTROSCOPY: CPT | Performed by: UROLOGY

## 2021-08-01 ENCOUNTER — APPOINTMENT (OUTPATIENT)
Dept: CT IMAGING | Facility: HOSPITAL | Age: 56
End: 2021-08-01
Attending: EMERGENCY MEDICINE
Payer: COMMERCIAL

## 2021-08-01 ENCOUNTER — HOSPITAL ENCOUNTER (EMERGENCY)
Facility: HOSPITAL | Age: 56
Discharge: HOME | End: 2021-08-01
Attending: EMERGENCY MEDICINE
Payer: COMMERCIAL

## 2021-08-01 VITALS
OXYGEN SATURATION: 97 % | HEART RATE: 89 BPM | RESPIRATION RATE: 16 BRPM | SYSTOLIC BLOOD PRESSURE: 144 MMHG | TEMPERATURE: 98 F | DIASTOLIC BLOOD PRESSURE: 88 MMHG | BODY MASS INDEX: 28 KG/M2 | WEIGHT: 190 LBS

## 2021-08-01 DIAGNOSIS — N12 PYELONEPHRITIS: Primary | ICD-10-CM

## 2021-08-01 LAB
ALBUMIN SERPL-MCNC: 4.2 G/DL (ref 3.4–5)
ALP LIVER SERPL-CCNC: 108 U/L
ALT SERPL-CCNC: 24 U/L
ANION GAP SERPL CALC-SCNC: 9 MMOL/L (ref 0–18)
AST SERPL-CCNC: 19 U/L (ref 15–37)
BASOPHILS # BLD AUTO: 0.02 X10(3) UL (ref 0–0.2)
BASOPHILS NFR BLD AUTO: 0.3 %
BILIRUB DIRECT SERPL-MCNC: 0.1 MG/DL (ref 0–0.2)
BILIRUB SERPL-MCNC: 0.5 MG/DL (ref 0.1–2)
BILIRUB UR QL: NEGATIVE
BUN BLD-MCNC: 22 MG/DL (ref 7–18)
BUN/CREAT SERPL: 20.8 (ref 10–20)
CALCIUM BLD-MCNC: 9.1 MG/DL (ref 8.5–10.1)
CHLORIDE SERPL-SCNC: 107 MMOL/L (ref 98–112)
CLARITY UR: CLEAR
CO2 SERPL-SCNC: 24 MMOL/L (ref 21–32)
COLOR UR: YELLOW
CREAT BLD-MCNC: 1.06 MG/DL
DEPRECATED RDW RBC AUTO: 45.5 FL (ref 35.1–46.3)
EOSINOPHIL # BLD AUTO: 0 X10(3) UL (ref 0–0.7)
EOSINOPHIL NFR BLD AUTO: 0 %
ERYTHROCYTE [DISTWIDTH] IN BLOOD BY AUTOMATED COUNT: 13.3 % (ref 11–15)
ETHANOL SERPL-MCNC: 99 MG/DL (ref ?–3)
GLUCOSE BLD-MCNC: 121 MG/DL (ref 70–99)
GLUCOSE UR-MCNC: NEGATIVE MG/DL
HCT VFR BLD AUTO: 39.3 %
HGB BLD-MCNC: 13.3 G/DL
IMM GRANULOCYTES # BLD AUTO: 0.03 X10(3) UL (ref 0–1)
IMM GRANULOCYTES NFR BLD: 0.4 %
KETONES UR-MCNC: NEGATIVE MG/DL
LIPASE SERPL-CCNC: 82 U/L (ref 73–393)
LYMPHOCYTES # BLD AUTO: 0.76 X10(3) UL (ref 1–4)
LYMPHOCYTES NFR BLD AUTO: 11.2 %
M PROTEIN MFR SERPL ELPH: 8.1 G/DL (ref 6.4–8.2)
MCH RBC QN AUTO: 31.3 PG (ref 26–34)
MCHC RBC AUTO-ENTMCNC: 33.8 G/DL (ref 31–37)
MCV RBC AUTO: 92.5 FL
MONOCYTES # BLD AUTO: 0.35 X10(3) UL (ref 0.1–1)
MONOCYTES NFR BLD AUTO: 5.2 %
NEUTROPHILS # BLD AUTO: 5.61 X10 (3) UL (ref 1.5–7.7)
NEUTROPHILS # BLD AUTO: 5.61 X10(3) UL (ref 1.5–7.7)
NEUTROPHILS NFR BLD AUTO: 82.9 %
NITRITE UR QL STRIP.AUTO: NEGATIVE
OSMOLALITY SERPL CALC.SUM OF ELEC: 295 MOSM/KG (ref 275–295)
PH UR: 6 [PH] (ref 5–8)
PLATELET # BLD AUTO: 225 10(3)UL (ref 150–450)
POTASSIUM SERPL-SCNC: 3.7 MMOL/L (ref 3.5–5.1)
PROT UR-MCNC: NEGATIVE MG/DL
RBC # BLD AUTO: 4.25 X10(6)UL
SODIUM SERPL-SCNC: 140 MMOL/L (ref 136–145)
SP GR UR STRIP: 1 (ref 1–1.03)
UROBILINOGEN UR STRIP-ACNC: <2
WBC # BLD AUTO: 6.8 X10(3) UL (ref 4–11)

## 2021-08-01 PROCEDURE — 80076 HEPATIC FUNCTION PANEL: CPT | Performed by: EMERGENCY MEDICINE

## 2021-08-01 PROCEDURE — 80048 BASIC METABOLIC PNL TOTAL CA: CPT | Performed by: EMERGENCY MEDICINE

## 2021-08-01 PROCEDURE — 96374 THER/PROPH/DIAG INJ IV PUSH: CPT

## 2021-08-01 PROCEDURE — 82077 ASSAY SPEC XCP UR&BREATH IA: CPT | Performed by: EMERGENCY MEDICINE

## 2021-08-01 PROCEDURE — 74176 CT ABD & PELVIS W/O CONTRAST: CPT | Performed by: EMERGENCY MEDICINE

## 2021-08-01 PROCEDURE — 96361 HYDRATE IV INFUSION ADD-ON: CPT

## 2021-08-01 PROCEDURE — 85025 COMPLETE CBC W/AUTO DIFF WBC: CPT | Performed by: EMERGENCY MEDICINE

## 2021-08-01 PROCEDURE — 83690 ASSAY OF LIPASE: CPT | Performed by: EMERGENCY MEDICINE

## 2021-08-01 PROCEDURE — 81001 URINALYSIS AUTO W/SCOPE: CPT | Performed by: EMERGENCY MEDICINE

## 2021-08-01 PROCEDURE — 96375 TX/PRO/DX INJ NEW DRUG ADDON: CPT

## 2021-08-01 PROCEDURE — 99284 EMERGENCY DEPT VISIT MOD MDM: CPT

## 2021-08-01 RX ORDER — KETOROLAC TROMETHAMINE 10 MG/1
10 TABLET, FILM COATED ORAL EVERY 6 HOURS PRN
Qty: 20 TABLET | Refills: 0 | Status: SHIPPED | OUTPATIENT
Start: 2021-08-01 | End: 2021-08-08

## 2021-08-01 RX ORDER — ONDANSETRON 2 MG/ML
4 INJECTION INTRAMUSCULAR; INTRAVENOUS ONCE
Status: COMPLETED | OUTPATIENT
Start: 2021-08-01 | End: 2021-08-01

## 2021-08-01 RX ORDER — KETOROLAC TROMETHAMINE 30 MG/ML
30 INJECTION, SOLUTION INTRAMUSCULAR; INTRAVENOUS ONCE
Status: COMPLETED | OUTPATIENT
Start: 2021-08-01 | End: 2021-08-01

## 2021-08-01 RX ORDER — CEPHALEXIN 500 MG/1
500 CAPSULE ORAL 2 TIMES DAILY
Qty: 14 CAPSULE | Refills: 0 | Status: SHIPPED | OUTPATIENT
Start: 2021-08-01 | End: 2021-08-08

## 2021-08-01 RX ORDER — ONDANSETRON 4 MG/1
4 TABLET, ORALLY DISINTEGRATING ORAL EVERY 4 HOURS PRN
Qty: 10 TABLET | Refills: 0 | Status: SHIPPED | OUTPATIENT
Start: 2021-08-01 | End: 2021-08-08

## 2021-08-01 NOTE — ED PROVIDER NOTES
Patient Seen in: [facility] AND [facility] Emergency Department      History   Patient presents with:  Back Pain  Vomiting    Stated Complaint:     HPI/Subjective:   HPI    57-year-old female with history of hypertension, arthritis, here with complaints of acute 69   Resp 20   Temp 97.8 °F (36.6 °C)   Temp src Temporal   SpO2 98 %   O2 Device None (Room air)       Current:/88   Pulse 89   Temp 97.8 °F (36.6 °C) (Temporal)   Resp 16   Wt 86.2 kg   SpO2 97%   BMI 27.66 kg/m²         Physical Exam  Vitals and n Alkaline Phosphatase 108 46 - 118 U/L    Bilirubin, Total 0.5 0.1 - 2.0 mg/dL    Bilirubin, Direct 0.1 0.0 - 0.2 mg/dL    Total Protein 8.1 6.4 - 8.2 g/dL    Albumin 4.2 3.4 - 5.0 g/dL   Ethyl Alcohol    Collection Time: 08/01/21  5:31 AM   Result Value Re found.    CT AP WITHOUT CONTRAST    COMPARISON: CT AP without contrast 2/22/21. FINDINGS:  Few small nonobstructive calyceal calculi in the lower pole of the right kidney.  Slight right ureterectasis with minimal periureteral stranding, but no evidence o is printed above. DD:  08/01/2021/DT:  08/01/2021      EMERGENCY DEPARTMENT COURSE AND TREATMENT:  Patient's condition was stable during Emergency Department evaluation.      55yoF with flank pain, vomiting  - I personally reviewed and interpreted all th mouth every 4 (four) hours as needed for Nausea., Normal, Disp-10 tablet, R-0    cephalexin 500 MG Oral Cap  Take 1 capsule (500 mg total) by mouth 2 (two) times daily for 7 days. , Normal, Disp-14 capsule, R-0    Ketorolac Tromethamine 10 MG Oral Tab  Take
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
## 2021-12-04 ENCOUNTER — TELEPHONE (OUTPATIENT)
Dept: RHEUMATOLOGY | Facility: CLINIC | Age: 56
End: 2021-12-04

## 2021-12-04 ENCOUNTER — OFFICE VISIT (OUTPATIENT)
Dept: RHEUMATOLOGY | Facility: CLINIC | Age: 56
End: 2021-12-04
Payer: COMMERCIAL

## 2021-12-04 ENCOUNTER — LAB ENCOUNTER (OUTPATIENT)
Dept: LAB | Facility: HOSPITAL | Age: 56
End: 2021-12-04
Attending: INTERNAL MEDICINE
Payer: COMMERCIAL

## 2021-12-04 VITALS
HEIGHT: 69.5 IN | BODY MASS INDEX: 28.52 KG/M2 | DIASTOLIC BLOOD PRESSURE: 95 MMHG | SYSTOLIC BLOOD PRESSURE: 150 MMHG | WEIGHT: 197 LBS | HEART RATE: 64 BPM

## 2021-12-04 DIAGNOSIS — Z51.81 MEDICATION MONITORING ENCOUNTER: ICD-10-CM

## 2021-12-04 DIAGNOSIS — M05.79 RHEUMATOID ARTHRITIS INVOLVING MULTIPLE SITES WITH POSITIVE RHEUMATOID FACTOR (HCC): Primary | ICD-10-CM

## 2021-12-04 DIAGNOSIS — M05.79 RHEUMATOID ARTHRITIS INVOLVING MULTIPLE SITES WITH POSITIVE RHEUMATOID FACTOR (HCC): ICD-10-CM

## 2021-12-04 PROCEDURE — 86140 C-REACTIVE PROTEIN: CPT

## 2021-12-04 PROCEDURE — 82040 ASSAY OF SERUM ALBUMIN: CPT

## 2021-12-04 PROCEDURE — 86480 TB TEST CELL IMMUN MEASURE: CPT

## 2021-12-04 PROCEDURE — 3077F SYST BP >= 140 MM HG: CPT | Performed by: INTERNAL MEDICINE

## 2021-12-04 PROCEDURE — 84450 TRANSFERASE (AST) (SGOT): CPT

## 2021-12-04 PROCEDURE — 3008F BODY MASS INDEX DOCD: CPT | Performed by: INTERNAL MEDICINE

## 2021-12-04 PROCEDURE — 82565 ASSAY OF CREATININE: CPT

## 2021-12-04 PROCEDURE — 99214 OFFICE O/P EST MOD 30 MIN: CPT | Performed by: INTERNAL MEDICINE

## 2021-12-04 PROCEDURE — 84460 ALANINE AMINO (ALT) (SGPT): CPT

## 2021-12-04 PROCEDURE — 36415 COLL VENOUS BLD VENIPUNCTURE: CPT

## 2021-12-04 PROCEDURE — 85025 COMPLETE CBC W/AUTO DIFF WBC: CPT

## 2021-12-04 PROCEDURE — 85652 RBC SED RATE AUTOMATED: CPT

## 2021-12-04 PROCEDURE — 3080F DIAST BP >= 90 MM HG: CPT | Performed by: INTERNAL MEDICINE

## 2021-12-04 NOTE — PATIENT INSTRUCTIONS
You were seen today for rheumatoid arthritis  Still have some swelling in your right wrist and even your hand  Plan to switch you to Rinvoq  We will get the prior authorization started  Blood work today  Follow-up in about 3 months, March

## 2021-12-04 NOTE — PROGRESS NOTES
Peggy Clement is a 60year old female. HPI:   Patient presents with: Follow - Up  Rheumatoid Arthritis      I had the pleasure of seeing Peggy Clement on 12/4/2021 for follow up Seropositive RA.      Current Medications:  Xeljanz 11 mg daily- restar minimal improvement    Today:  Presents for follow-up of rheumatoid arthritis.   Last seen in Jan 2021  Has been on Xeljanz 11 mg daily since Feb but changed insurances so has been off of it for the last month   While on Lovella Lemme still having R wrist pain an lesions.  No nail findings  MSK:  Cervical spine: FROM  Hands: R 2nd and 3rd MCP chronic synovitis, able to make a full fist b/l  Wrist: R wrist swelling, limited ROM due to pain  Elbow: FROM, no pain or swelling or warmth on palpation  Shoulders: FROM, no 7.4 - 10.3 fL  10.1   Neutrophils %      %  56   Lymphocytes %      %  37   Monocytes %      %  6   Eosinophils %      %  1   Basophils %      %  1   Neutrophils Absolute      1.8 - 7.7 K/UL  4.0   Lymphocytes Absolute      1.0 - 4.0 K/UL  2.6   Monocytes

## 2021-12-04 NOTE — TELEPHONE ENCOUNTER
If you cannot patient approved for Rinvoq. She was on Eddie  but continues to have pain and swelling from her RA.   [facility]

## 2021-12-08 RX ORDER — UPADACITINIB 15 MG/1
15 TABLET, EXTENDED RELEASE ORAL DAILY
Qty: 90 TABLET | Refills: 1 | Status: SHIPPED | OUTPATIENT
Start: 2021-12-08

## 2021-12-08 RX ORDER — METHYLPREDNISOLONE 4 MG/1
TABLET ORAL
Qty: 1 EACH | Refills: 0 | Status: SHIPPED | OUTPATIENT
Start: 2021-12-08 | End: 2021-12-08

## 2021-12-08 RX ORDER — METHYLPREDNISOLONE 4 MG/1
TABLET ORAL
Qty: 1 EACH | Refills: 0 | Status: SHIPPED | OUTPATIENT
Start: 2021-12-08

## 2021-12-08 NOTE — TELEPHONE ENCOUNTER
Pt advised of Medrol Dose Christian and co-pay card. Pt advised to call the office with any questions or concerns.

## 2021-12-15 ENCOUNTER — TELEPHONE (OUTPATIENT)
Dept: RHEUMATOLOGY | Facility: CLINIC | Age: 56
End: 2021-12-15

## 2021-12-15 NOTE — TELEPHONE ENCOUNTER
Patient is calling to check status of Rinvoq. States pharmacy is still waiting for authorization. Please call back.

## 2021-12-15 NOTE — TELEPHONE ENCOUNTER
[facility], pharmacist aware. Okay to void.

## 2021-12-15 NOTE — TELEPHONE ENCOUNTER
Jim Ayala from [facility] [employer] order called they need to confirm if patient still needs the following medication. They show patient filled it with local pharmacy already. Please call back.     Phone # [phone]  Reference # [id number]      Medication Detail

## 2021-12-15 NOTE — TELEPHONE ENCOUNTER
Called [facility]  They have [employer] provided. They left a message for the patient to call them back. Spoke to patient, she was informed that her insurance was the problem. She said she will call them right now.

## 2022-03-04 NOTE — PATIENT INSTRUCTIONS
You were seen today for rheumatoid arthritis  Joints are better controlled  Continue Rinvoq for now  Repeat your kidney blood work in the next 2 to 3 weeks  Follow-up in 3 months
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
## 2022-10-30 NOTE — ED INITIAL ASSESSMENT (HPI)
AOx4. Viral symptoms-cough, chills, sore throat for ~1 mo. Seen by ic and treated with azithro last week without relief. Negative for covid and flu.  vss
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
## 2022-11-18 NOTE — ED QUICK NOTES
Received report from Jacqueline's ,   Pt c/o pain and states she wants to be admitted to the hospital. Md made aware. Attempted to educate and explain IS. Minimal evidence of teaching noted. Offered ice pack for leg , pt denied. Will continue to monitor.

## 2022-11-18 NOTE — ED QUICK NOTES
Rounding Completed    Plan of Care reviewed. Waiting for admit room. Elimination needs assessed. Provided water and warm blankets. Bed is locked and in lowest position. Call light within reach.

## 2022-11-18 NOTE — ED QUICK NOTES
Orders for admission, patient is aware of plan and ready to go upstairs.  Any questions, please call ED RN emiliana at extension [phone]    Patient Covid vaccination status: Partially vaccinated     COVID Test Ordered in ED: Rapid SARS-CoV-2 by PCR    COVID Suspicion at Admission: N/A    Running Infusions:  None    Mental Status/LOC at time of transport: A&ox3    Other pertinent information:   CIWA score: N/A   NIH score:  N/A
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
## 2022-11-20 NOTE — DISCHARGE INSTRUCTIONS
Ice elevate return if increased pain swelling numbness weakness. Follow-up with your doctor and orthopedics.   Take antibiotics as directed
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
## 2023-01-09 NOTE — PROGRESS NOTES
Per Dr Michelle Ocasio pt instructed to continue washing daily with soap and water and applying silvadene,gauze and spandage daily till surgery. Given extra spandage for home. Patient request for surgery signed by patient and witnessed and signed by RN. Pt has Green po at home for post-op pain. .  Pre-Surgical Instruction Handout, Dressing Protector Handout, and Post-Operative Instruction Handout given to and reviewed w/patient. All questions and concerns answered; pt verbalized an understanding of all pre-operative teaching. Patient instructed to call the office with any further questions and/or concerns. Patient escorted to surgery scheduling to schedule surgery and post-operative appointments.

## 2023-01-11 NOTE — TELEPHONE ENCOUNTER
Spoke with pt made aware surgery is canceled due to covid + 1/10/23, pt denies symptoms and denies fever. Pt took covid at home test that was negative and wanted to proceed with surgery, protocol explained to pt, pt verbalized understanding. Made pt aware to notify her primary care doctor of results and if symptoms occur. Will reschedule surgery per request to 1/24/23, pt made aware to contact our office if symptoms develop pt verbalized understanding. Dr. Arash Pack notified.

## 2023-01-11 NOTE — PAT NURSING NOTE
Message left with Elvi Calvillo at Dr. Lady Bunn' office regarding patient positive Covid testing on 1/10/23 and need to be rescheduled.

## 2023-01-23 NOTE — TELEPHONE ENCOUNTER
Pt called the office requesting Norco refill for post-op pain from surgery 1/24/23, only has 7 pills left. Dr Monalisa Hansen notified  Pt told per Dr Monalisa Hansen he will refill prescription today. Verbalized understanding.   Dr Monalisa Hansen notified

## 2023-01-24 NOTE — ANESTHESIA PROCEDURE NOTES
Airway  Date/Time: 1/24/2023 10:23 AM  Urgency: Elective      General Information and Staff    Patient location during procedure: OR  Anesthesiologist: Temitope Bajwa MD  Performed: anesthesiologist     Indications and Patient Condition  Indications for airway management: anesthesia  Sedation level: deep  Preoxygenated: yes  Patient position: sniffing  Mask difficulty assessment: 1 - vent by mask    Final Airway Details  Final airway type: supraglottic airway      Successful airway: classic  Size 4       Number of attempts at approach: 1

## 2023-01-24 NOTE — ANESTHESIA PROCEDURE NOTES
Airway  Date/Time: 1/24/2023 11:17 AM  Urgency: Elective    Airway not difficult    General Information and Staff    Patient location during procedure: OR  Anesthesiologist: Celeste Fischer MD  Performed: anesthesiologist     Indications and Patient Condition  Indications for airway management: anesthesia  Sedation level: deep  Preoxygenated: yes  Patient position: sniffing  Mask difficulty assessment: 1 - vent by mask    Final Airway Details  Final airway type: endotracheal airway      Successful airway: ETT  Cuffed: yes   Successful intubation technique: Video laryngoscopy  Endotracheal tube insertion site: oral  Blade: GlideScope  Blade size: #3  ETT size (mm): 7.5    Cormack-Lehane Classification: grade I - full view of glottis  Placement verified by: chest auscultation and capnometry   Measured from: teeth  ETT to teeth (cm): 20  Number of attempts at approach: 1

## 2023-01-24 NOTE — BRIEF OP NOTE
Pre-Operative Diagnosis: Unspecified open wound, left lower leg, initial encounter [S81.802A]     Post-Operative Diagnosis: Unspecified open wound, left lower leg, initial encounter [S81.802A]      Procedure Performed:   DEBRIDEMENT OF LEFT LEG and flap reconstruction    Surgeon(s) and Role:     * Carlos Coelho MD - Primary    Assistant(s):        Surgical Findings: Extensive fat necrosis     Specimen: Wound excision     Estimated Blood Loss: 150 ml    Dictation Number:      Les Crowley MD  1/24/2023  12:16 PM

## 2023-01-24 NOTE — INTERVAL H&P NOTE
Pre-op Diagnosis: Unspecified open wound, left lower leg, initial encounter [S80.257E]    The above referenced H&P was reviewed by Miley Hunt MD on 1/24/2023, the patient was examined and no significant changes have occurred in the patient's condition since the H&P was performed. I discussed with the patient and/or legal representative the potential benefits, risks and side effects of this procedure; the likelihood of the patient achieving goals; and potential problems that might occur during recuperation. I discussed reasonable alternatives to the procedure, including risks, benefits and side effects related to the alternatives and risks related to not receiving this procedure. We will proceed with procedure as planned.
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
## 2023-01-25 NOTE — TELEPHONE ENCOUNTER
Spoke w/the pt and she complains of pain 8/10 5mg norco not effective last night, today 7.5mg norco tab from previous surgery taken that was helpful. Pt complains of body soreness. Denies s/s of infection or fever. Pt instructed to empty ARIANNA drain every 12 hours and bring record of outputs to appt scheduled for 1/31 w RN  Pt reminded to keep dressing clean and dry, to do minimal walking, and keep leg elevated. Pt made aware per Dr Terra Antunez scheduled appt w RN on 2/7, rescheduled MD appt to 2/13   Pt made aware norco 7.5mg sent to pharmacy on file for post op pain. Pt verbalized an understanding. Pt instructed to call the office w/any other questions and/or concerns. Dr. Terra Antunez notified.

## 2023-01-31 NOTE — PROGRESS NOTES
Surgery 1: L leg debridement / flap (1 drain)  - Date: 01/24/23  - Days Since: 7       Injury 1: L LEG HEMATOMA - seen 53 days post injury  - Date: 11/17/22  - Days Since: 75    Pt here for ARIANNA drain check to left leg  Pt identified w/2 identifiers and orders verified  Pt ambulating without assistance, slowly without difficulty. Pt assisted to exam chair and positioned for possible ARIANNA drain removal.  Pt presents w/surgical dressings C/D/I and ARIANNA bulb to suction. Pt denies c/o pain, use of analgesics, and s/s infection. Pt is keeping leg elevated while resting and minimal walking as ordered. Pt brought log w/ARIANNA outputs. Outputs for last 3 days/24-hour periods in cc:  20cc serosanguinous drainage emptied from ARIANNA drain at this time. Today 8:30am 68cc emptied from ARIANNA drain  1/30/23 82cc emptied from ARIANNA drain  1/29/23 130cc emptied from ARIANNA drain  1/28/23 140cc emptied from ARIANNA drain  dressings removed carefully. Dried blood to gauze and kerlix. Dry serous drainage to ARIANNA dressings and minimal dried blood to xeroform from staples. Staples C/D/I. Mild edema to lower leg distal to surgical site. De-identified photos sent electronically to Dr. Briscoe Galien f/u w/a phone conversation. Dr. Briscoe Galien notified of [place] outputs and pt asking if okay to fly to [place] for few days,  telephone order to keep drain in and not recommended to fly. Pt made aware and verbalized understanding. Sterile normal saline used to clean sutures, around ARIANNA insertion site and staples to left leg. New ARIANNA drain dressing applied to ARIANNA drain site. New xeroform placed over staples, gauze, kerlix and size 4 spandage applied to left leg distal to knee. Pt states that dressings  fit comfortably and denies any complaints. Pt made aware to keep dressings clean and dry, no dressing changes, continue elevating. Instructed to continue recording ARIANNA drain outputs every 12 hours with time and date and bring to next appt.   Pt instructed to call the office if she needs a dressing adjustment. Pt also instructed to call the office w/any questions and/or concerns. Pt verbalized an understanding. Pt reminded of appt on 2/7 w/RN at 11am  Dr. Jolee Crigler notified.     Left leg ARIANNA drain kept in as ordered   Next appt w/RN 2/7

## 2023-02-05 NOTE — TELEPHONE ENCOUNTER
PO 12    Concerned about bleeding around ARIANNA    Increase in output, and is more bloody    2/4   0700   48  2/4   1900   42    2/5   0700   71      Continue monitoring ARIANNA    Office tomorrow for evaluation      Call if any further questions, problems, or concerns.

## 2023-02-06 NOTE — TELEPHONE ENCOUNTER
Left voice message for pt that Dr Michelle Ocasio would like her to be seen in the office today at 1120. Please call us back to let us know you got the message and if you can make the appointment. Dr Michelle Ocasio notified.

## 2023-02-06 NOTE — PROGRESS NOTES
Injury 1: L LEG HEMATOMA - seen 53 days post injury  - Date: 11/17/22  - Days Since: 81  Surgery 1: L leg debridement / flap (1 drain)  - Date: 01/24/23  - Days Since: 13  Has had increase pain L lower leg since 1/1/23, pain  had been decreasing prior to that. Constant L lower leg discomfort increases with standing and at night. Rates pain 8/10. Taking Norco prn,helpful. Ambulating without assistance,gait steady. 2/5/23 noticed clots in ARIANNA tubing and ARIANNA dressing with bloody drainage,millked tubing and then had increase in ARIANNA output and drainage more bloody, Dr Kristel Welch notified, appointment made to be seen in the office today. L leg ace wrap CDI,removed. L leg staples CDI,well approximated without s/s of infection. ARIANNA to bulb suction intact and patent,draining bloody drainage. ARIANNA emptied. ARIANNA Outputs for 24 hours  2/6/23 46cc  2/5/23 165cc  2/4/23 90cc  2/3/23 166cc  2/2/23 183cc  2/1/23 132cc  1/31/23 118cc  L leg edematous, pt feels has not increased or decreased. Negative homans sign L leg  RN Appointment 2/7/23 for staple removal and drain check    13 days.     Still having pain and swelling    118, 132, 183, 166 drainages last 24-hour periods      Mild tenderness but no infection  No hematoma  Incision healing well    Leave drain in, as it is draining adequately  2 staples in    1 week

## 2023-02-10 NOTE — TELEPHONE ENCOUNTER
PO  17    Needs refill for [place].    RX sent. Call if any further questions, problems, or concerns. [facility]  checked.

## 2023-02-13 NOTE — PROGRESS NOTES
Per Dr Rawleigh Boeck verbal order staples removed. Pt tolerated well. Cleaned scar with sterile water,dried and redressed with xeroform,gauzes,kerlix,spandage and ace bandage. ARIANNA site redressed with 2X2 gauzes and tegaderm. Pt instructed no dressing changes,continue to drain and record ARIANNA outputs Q 12 hours. Do not routinely milk ARIANNA tubing. Verbalized understanding.

## 2023-02-13 NOTE — PROGRESS NOTES
Injury 1: L LEG HEMATOMA - seen 53 days post injury  - Date: 11/17/22  - Days Since: 88  Surgery 1: L leg debridement / flap (1 drain)  - Date: 01/24/23  - Days Since: 20   Ambulating without assistance,gait steady. Constant L leg ARIANNA site pain,aching,at times sharp. Rates pain 7/10. Taking Norco prn,helps a little. Arrived with L leg ace wrap and ARIANNA site tegaderm CDI,removed. L leg incision with staples CDI,well approximated without s/s of infection. L leg edematous,pt believes edema is lessening. ARIANNA to bulb suction,patent,draining sanguinous drainage. ARIANNA secure with suture intact,site edematous,tender to touch,small amount of tannish brown drainage.   ARIANNA outputs for 24 hours   2/13/23 60cc   2/12/23 97cc   2/11/23 93cc   2/10/23 115cc   2/9/23 122cc   2/8/23 103cc   2/7/23 92cc    20 days postop  Still moderate pain, requiring Norco    Still significant ARIANNA drainage    Incision well-healed  No infection  Perioperative area is soft, slightly tender, but no hematoma  Homans' sign negative  No calf tenderness    Staples  Leave drain in  No dressing changes    1 week

## 2023-02-20 NOTE — PROGRESS NOTES
Surgery 1: L leg debridement / flap (1 drain)  - Date: 01/24/23  - Days Since: 27      Injury 1: L LEG HEMATOMA - seen 53 days post injury  - Date: 11/17/22  - Days Since: 95    Left leg dressing C/D/I   Pt denies pain at this time however has intermittent sharp pain to incision and ARIANNA drain site that lasts a few seconds 8/10  Lower leg remain edematous   Pt has been elevating and minimal walking   No dressing changes   Incision site edges well approximated   Xeroform minimal dried blood  ARIANNA dressing moist serosanguinous drainage   ARIANNA drain intact, emptied 2.5cc serosanguinous drainage at this time  2/19 ARIANNA drain outputs less than 20cc/24 hour   2/18 ARIANNA drain outputs less than 20cc/24 hour  2/17 ARIANNA drain outputs less than 20cc/24 hour      27 days postop  Some chava-incisional pain and pain in the flap medially  Full weightbearing    Incision flaps look excellent  Mild tenderness  Sangeeta negative  No pain with passive extension of the gastrocnemius    ARIANNA drainage minimal    Remove drain  Daily shower  Start Eucerin moisturizers and massage  \"After Skin Surgery\" pamphlet dispensed.     1 week

## 2023-02-27 NOTE — PROGRESS NOTES
Surgery 1: L leg debridement / flap (1 drain)  - Date: 01/24/23  - Days Since: 34      Injury 1: L LEG HEMATOMA - seen 53 days post injury  - Date: 11/17/22  - Days Since: 102    Pt complains of discomfort and sensitivity to left lateral leg and to incision site constant 1/10  Left lower leg edematous   Pt washing daily with soap and water and applying Eucerin as directed    34 days postop    Mild pain and sensitivity laterally    Flaps and incision look excellent  Moderate edema  No calf tenderness  Homans negative    Continue use of massage  Medi-stockings 30-20    Discharged. To call if any problems or concerns.

## 2023-05-31 NOTE — TELEPHONE ENCOUNTER
Pt scheduled an appointment to see Dr. Harriett Dandy on 23 for follow up. Pt would like to know if the doctor would like her to do blood work prior to her appointment. Please, notify the patient whether she would need it or not the standing order she has is .

## 2023-09-07 NOTE — PROGRESS NOTES
Low Vit D, begin 50,000IU weekly x 12 weeks, and then begin 2,000IU daily, take with food  Low Vit B12, can begin monthly injections x 6 months

## 2023-10-03 NOTE — TELEPHONE ENCOUNTER
LOV: 6/9/23  Future Appointments   Date Time Provider Nakia Clemonsi   10/19/2023 11:00 AM EM[facility] NURSE [address]   1/11/2024 11:40 AM Jose Gaffney PA-C [address]     Labs:    Component      Latest Ref Rng 9/1/2023   WBC      4.0 - 11.0 x10(3) uL 4.2    RBC      3.80 - 5.30 x10(6)uL 4.00    Hemoglobin      12.0 - 16.0 g/dL 12.9    Hematocrit      35.0 - 48.0 % 39.4    MCV      80.0 - 100.0 fL 98.5    MCH      26.0 - 34.0 pg 32.3    MCHC      31.0 - 37.0 g/dL 32.7    RDW-SD      35.1 - 46.3 fL 44.8    RDW      11.0 - 15.0 % 12.3    Platelet Count      705.0 - 450.0 10(3)uL 241.0    Prelim Neutrophil Abs      1.50 - 7.70 x10 (3) uL 1.87    Neutrophils Absolute      1.50 - 7.70 x10(3) uL 1.87    Lymphocytes Absolute      1.00 - 4.00 x10(3) uL 1.98    Monocytes Absolute      0.10 - 1.00 x10(3) uL 0.27    Eosinophils Absolute      0.00 - 0.70 x10(3) uL 0.02    Basophils Absolute      0.00 - 0.20 x10(3) uL 0.01    Immature Granulocyte Absolute      0.00 - 1.00 x10(3) uL 0.00    Neutrophils %      % 45.1    Lymphocytes %      % 47.7    Monocytes %      % 6.5    Eosinophils %      % 0.5    Basophils %      % 0.2    Immature Granulocyte %      % 0.0    Glucose      70 - 99 mg/dL 86    Sodium      136 - 145 mmol/L 144    Potassium      3.5 - 5.1 mmol/L 3.7    Chloride      98 - 112 mmol/L 109    Carbon Dioxide, Total      21.0 - 32.0 mmol/L 29.0    ANION GAP      0 - 18 mmol/L 6    BUN      7 - 18 mg/dL 13    CREATININE      0.55 - 1.02 mg/dL 1.04 (H)    BUN/CREATININE RATIO      10.0 - 20.0  12.5    CALCIUM      8.5 - 10.1 mg/dL 8.9    CALCULATED OSMOLALITY      275 - 295 mOsm/kg 297 (H)    EGFR      >=60 mL/min/1.73m2 62    ALT (SGPT)      13 - 56 U/L 17    AST (SGOT)      15 - 37 U/L 20    ALKALINE PHOSPHATASE      46 - 118 U/L 90    Total Bilirubin      0.1 - 2.0 mg/dL 0.5    PROTEIN, TOTAL      6.4 - 8.2 g/dL 7.2    Albumin      3.4 - 5.0 g/dL 3.4    Globulin      2.8 - 4.4 g/dL 3.8 A/G Ratio      1.0 - 2.0  0.9 (L)    Patient Fasting for CMP?  Yes    SED RATE      0 - 30 mm/Hr 32 (H)    C-REACTIVE PROTEIN      <0.30 mg/dL 0.32 (H)       Legend:  (H) High  (L) Low

## 2023-10-04 NOTE — TELEPHONE ENCOUNTER
Requesting   Requested Prescriptions     Pending Prescriptions Disp Refills    ERGOCALCIFEROL 1.25 MG (23704 UT) Oral Cap [Pharmacy Med Name: VITAMIN D2 1.25MG(50,000 UNIT)] 12 capsule 0     Sig: TAKE 1 CAPSULE (50,000 UNITS TOTAL) BY MOUTH ONCE A WEEK.  WITH FOOD FOR 12 WEEKS TOTAL THEN BEGIN OTC VITAMIN D AT 2000 UNITS DAILY WITH FOOD THEREAFTER       LOV: 9/21/23  RTC: not noted  Filled: 9/7/23 #12 with 0 refills    Future Appointments   Date Time Provider Nakia Solis   10/19/2023 11:00 AM EMG 20 [facility] NURSE [address]   1/11/2024 11:40 AM Svetlana Dewey PA-C [address]     Refill too early

## 2023-10-17 NOTE — TELEPHONE ENCOUNTER
Requesting     Requested Prescriptions     Pending Prescriptions Disp Refills    WEGOVY 1.7 MG/0.75ML Subcutaneous Solution Auto-injector [Pharmacy Med Name: Charlesfabian Crumpn 1.7 MG/0.75 ML PEN]  0     Sig: INJECT 0.75 ML (1.7 MG TOTAL) INTO THE SKIN ONCE A WEEK.      LOV: 9/21/23  RTC: not noted  Filled: 9/21/23 #3 with 0 refills    Future Appointments   Date Time Provider Nakia Solis   10/19/2023 11:00 AM EMG 20 [facility] NURSE [address]   1/11/2024 11:40 AM Curtis Raines PA-C [address] [facility]

## 2023-10-17 NOTE — TELEPHONE ENCOUNTER
Patient is scheduled this Thursday in [place] for a nurse visit - Cari/staff will not be there.   My chart to patient to reschedule

## 2023-10-17 NOTE — TELEPHONE ENCOUNTER
PA start    Prior authorization for: Rinvoq renewal    Medication form: 15mg PO    Submission method: Sahil    Spoke with (if by phone):     Date submitted: 10/17/23    Tracking #:    QF-TB result: No results since 12/06/21

## 2023-10-18 NOTE — TELEPHONE ENCOUNTER
PA Approved    Prior authorization for: Rinvoq    Medication form: 15mg PO    Date received: 10/18/2023    Approval #: [id number] nge  [id number]    Approved dates: 10/17/2023 to 10/17/2024    Pharmacy for medication: [facility]    QF-TB results: No results since 12/06/21

## 2023-11-06 NOTE — TELEPHONE ENCOUNTER
Pt left message she needs refill of wegovy. I tried to call back about going up in dose as her message said she could - I got voicemail. Please refill 1.7 mg for now.

## 2023-12-17 NOTE — TELEPHONE ENCOUNTER
Requesting   Requested Prescriptions     Pending Prescriptions Disp Refills    WEGOVY 1.7 MG/0.75ML Subcutaneous Solution Auto-injector [Pharmacy Med Name: Pita Colemant 1.7 MG/0.75 ML PEN]  0     Sig: INJECT 0.75 ML (1.7 MG TOTAL) INTO THE SKIN ONCE A WEEK.          LOV: 9/21/23  RTC:   Last Relevant Labs:   Filled: 11/20/23 #3ml with 0 refills    Future Appointments   Date Time Provider Nakia Solis   12/18/2023 10:00 AM  [facility] [address]   1/11/2024 11:40 AM Arelis Walker PA-C [address]

## 2023-12-30 NOTE — TELEPHONE ENCOUNTER
Requesting   Requested Prescriptions     Pending Prescriptions Disp Refills    SAXENDA 18 MG/3ML Subcutaneous Solution Pen-injector [Pharmacy Med Name: SAXENDA 18 MG/3 ML PEN]  2     Sig: INJECT 0.6 MG INTO THE SKIN DAILY FOR 7 DAYS, THEN 1.2 MG DAILY FOR 7 DAYS, THEN 1.8 MG DAILY FOR 7 DAYS, THEN 2.4 MG DAILY FOR 7 DAYS, THEN 3 MG DAILY.      LOV: 9/21/23  RTC: not noted  Filled: 6/30/23 #15 with 2 refills    Future Appointments   Date Time Provider Nakia Solis   1/11/2024 11:40 AM May Soriano PA-C [address]     Pt is an jose miguel

## 2024-01-11 NOTE — PROGRESS NOTES
HISTORY OF PRESENT ILLNESS  Chief Complaint   Patient presents with    Weight Check     -8       Shivani Francis is a 58 year old female here for follow up in medical weight loss program.   -8lbs  Compliant on wegovy  Denies chest pain, shortness of breath, dizziness, blurred vision, headache, paresthesia, nausea/vomiting.       Exercise/Activity: walking 3 days a [place] the city to [place]   Nutrition: 24 hour food log reviewed, eating regular meals, +protein  Stress: 4-5/10  Sleep: wakes up at night, sometimes will wake up and eat      Wt Readings from Last 6 Encounters:   01/11/24 187 lb (84.8 kg)   09/21/23 195 lb (88.5 kg)   06/30/23 203 lb (92.1 kg)   06/09/23 204 lb (92.5 kg)   02/06/23 198 lb (89.8 kg)   01/24/23 198 lb (89.8 kg)            Breakfast Lunch Dinner Snacks Fluids   Reviewed           REVIEW OF SYSTEMS  GENERAL HEALTH: feels well otherwise, denied any fevers chills or night sweats   RESPIRATORY: denies shortness of breath   CARDIOVASCULAR: denies chest pain  GI: denies abdominal pain    EXAM  /82   Pulse 78   Resp 18   Ht 5' 8.5\" (1.74 m)   Wt 187 lb (84.8 kg)   LMP 04/02/2010 (Exact Date)   SpO2 99%   BMI 28.02 kg/m²   GENERAL: well developed, well nourished,in no apparent distress, A/O x3  SKIN: no rashes,no suspicious lesions  HEENT: atraumatic, normocephalic, OP-clear, PERRL  NECK: supple,no adenopathy  LUNGS: clear to auscultation bilaterally   CARDIO: RRR without murmur  EXTREMITIES: no cyanosis, no clubbing, no edema    Lab Results   Component Value Date    WBC 4.2 09/01/2023    RBC 4.00 09/01/2023    HGB 12.9 09/01/2023    HCT 39.4 09/01/2023    MCV 98.5 09/01/2023    MCH 32.3 09/01/2023    MCHC 32.7 09/01/2023    RDW 12.3 09/01/2023    .0 09/01/2023    MPV 10.1 04/02/2018     Lab Results   Component Value Date    GLU 86 09/01/2023    BUN 13 09/01/2023    BUNCREA 12.5 09/01/2023    CREATSERUM 1.04 (H) 09/01/2023    ANIONGAP 6 09/01/2023    GFRNAA 47 (L)  03/18/2022    GFRAA 54 (L) 03/18/2022    CA 8.9 09/01/2023    OSMOCALC 297 (H) 09/01/2023    ALKPHO 90 09/01/2023    AST 20 09/01/2023    ALT 17 09/01/2023    BILT 0.5 09/01/2023    TP 7.2 09/01/2023    ALB 3.4 09/01/2023    GLOBULIN 3.8 09/01/2023     09/01/2023    K 3.7 09/01/2023     09/01/2023    CO2 29.0 09/01/2023     Lab Results   Component Value Date     06/29/2023    A1C 5.9 (H) 06/29/2023     Lab Results   Component Value Date    CHOLEST 171 09/01/2023    TRIG 99 09/01/2023    HDL 57 09/01/2023    LDL 96 09/01/2023    VLDL 16 09/01/2023    NONHDLC 114 09/01/2023     Lab Results   Component Value Date    T4F 0.9 09/01/2023    TSH 1.310 09/01/2023     Lab Results   Component Value Date    B12 333 09/01/2023     Lab Results   Component Value Date    VITD 20.6 (L) 09/01/2023       Current Outpatient Medications on File Prior to Visit   Medication Sig Dispense Refill    semaglutide-weight management (WEGOVY) 1.7 MG/0.75ML Subcutaneous Solution Auto-injector Inject 0.75 mL (1.7 mg total) into the skin once a week. 3 mL 0    Upadacitinib ER (RINVOQ) 15 MG Oral Tablet 24 Hr Take 15 mg by mouth daily. 90 tablet 1    metoprolol tartrate 50 MG Oral Tab Take 1 tablet (50 mg total) by mouth 2 (two) times daily with meals.      PARoxetine 20 MG Oral Tab Take 1 tablet (20 mg total) by mouth every morning.      amLODIPine 5 MG Oral Tab Take 1 tablet (5 mg total) by mouth daily.      Benazepril HCl 20 MG Oral Tab Take 1 tablet (20 mg total) by mouth daily.      gabapentin 300 MG Oral Cap Start with night time dose only. If well tolerated, increase to two times daily. If well tolerated, increase to three times daily. (Patient taking differently: nightly. Start with night time dose only. If well tolerated, increase to two times daily. If well tolerated, increase to three times daily.) 90 capsule 0    ergocalciferol 1.25 MG (51634 UT) Oral Cap Take 1 capsule (50,000 Units total) by mouth once a week. With  food for 12 weeks total then begin OTC Vitamin D at 2000 units daily with food thereafter 12 capsule 0    HYDROcodone-acetaminophen 7.5-325 MG Oral Tab Take 0.5-1 tablets by mouth every 4 to 6 hours as needed for Pain. (Patient not taking: Reported on 1/11/2024) 25 tablet 0     No current facility-administered medications on file prior to visit.       ASSESSMENT  Analyzed weight data:       Diagnoses and all orders for this visit:    Encounter for therapeutic drug level monitoring    Overweight (BMI 25.0-29.9)    S/P bariatric surgery    Prediabetes    Benign essential hypertension    Dyslipidemia    Vitamin B12 deficiency    Vitamin D deficiency        PLAN  Initial Weight: 203lbs  Initial Weight Date: 6/30/23  Today's Weight: 187 lbs  5% Goal: 10.15lbs  10% Goal: 20.3lbs  Total Weight Loss: Down 9lbs/Net loss 17 lbs    Will continue and increase wegovy - advised side effects and adverse effects of medication   HTN - blood pressure well controlled on current medication - advised signs and symptoms of hypotension and will monitor with continued weight loss  Prediabetes - Continue to work on low carb diet  HLD - lifestyle changes  B12 administered today  Consistency with logging foods - protein and produce  Nutrition: low carb diet/ recommended to eat breakfast daily/ regular protein intake  Medication use and side effects reviewed with patient.  Medication contraindications: topamax - kidney stones, stimulant   Follow up with dietitian and psychologist as recommended.  Discussed the role of sleep and stress in weight management.  Counseled on comprehensive weight loss plan including attention to nutrition, exercise and behavior/stress management for success. See patient instruction below for more details.  Discussed strategies to overcome barriers to successful weight loss and weight maintenance  FITTE: ACSM recommendations (150-300 minutes/ week in active weight loss)   Total time spent on chart review,  pre-charting, obtaining history, counseling, and educating, reviewing labs was 30 minutes.  Weight Loss consent to treat reviewed and signed       NOTE TO PATIENT: The 21st Century Cures Act makes clinical notes like these available to patients in the interest of transparency. Clinical notes are medical documents used by physicians and care providers to communicate with each other. These documents include medical language and terminology, abbreviations, and treatment information that may sound technical and at times possibly unfamiliar. In addition, at times, the verbiage may appear blunt or direct. These documents are one tool providers use to communicate relevant information and clinical opinions of the care providers in a way that allows common understanding of the clinical context.     There are no Patient Instructions on file for this visit.    No follow-ups on file.    Patient verbalizes understanding.    Cari Sue PA-C  1/11/2024

## 2024-05-25 NOTE — ED INITIAL ASSESSMENT (HPI)
Pt presents to ed with c/o HTN since Monday. Pt c/o headache and blurry vision since Monday. Pt aox4, speaking in full sentences.     Pt states she has been taking her BP meds.
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
## 2024-07-16 NOTE — TELEPHONE ENCOUNTER
Requesting   Requested Prescriptions     Pending Prescriptions Disp Refills    WEGOVY 2.4 MG/0.75ML Subcutaneous Solution Auto-injector [Pharmacy Med Name: WEGOVY 2.4 MG/0.75 ML PEN]  1     Sig: INJECT 0.75 ML (2.4 MG TOTAL) INTO THE SKIN ONCE A WEEK.      LOV: 01/11/24  RTC:   Last Relevant Labs:   Filled: 01/11/24 #9ml with 1 refills    No future appointments.

## 2024-08-30 NOTE — TELEPHONE ENCOUNTER
Requesting   Requested Prescriptions     Pending Prescriptions Disp Refills    WEGOVY 2.4 MG/0.75ML Subcutaneous Solution Auto-injector [Pharmacy Med Name: WEGOVY 2.4 MG/0.75 ML PEN]  0     Sig: INJECT 0.75 ML (2.4 MG TOTAL) INTO THE SKIN ONCE A WEEK.      LOV: 01/11/24  RTC: 3mo  Last Relevant Labs:   Filled: 07/16/24/ #3ml with 0 refills    No future appointments.

## 2024-10-03 NOTE — TELEPHONE ENCOUNTER
Patient last seen 6/9/2023. Needs appointment before anything can be submitted to insurance. She has been sent [employer] messages to schedule an appointment.     Please help her schedule an appointment.

## 2024-10-03 NOTE — TELEPHONE ENCOUNTER
Mely from [facility] is calling regarding prior authorization for a medication that will  soon and needs to get renewed    RINVOG    Cover my Meds Key #[id number]    Please advise

## 2024-10-15 NOTE — TELEPHONE ENCOUNTER
LOV: 6/9/23  No future appointments.    ASSESSMENT/PLAN:      Seropositive RA (+CCP)- better controlled  - On Rinvoq daily  - joints are doing well on medication  - blood work today     L shoulder pain  - has FROM  - will refer to PT     Hx of Kidney stones  - stable      Pt will f/u in 6 mos, will get blood work every 3 mos      Sandy Goldberg MD  6/9/2023  1:45 PM

## 2024-10-15 NOTE — TELEPHONE ENCOUNTER
Current Outpatient Medications   Medication Sig Dispense Refill    Upadacitinib ER (RINVOQ) 15 MG Oral Tablet 24 Hr Take 15 mg by mouth daily. 90 tablet 1

## 2024-10-16 NOTE — TELEPHONE ENCOUNTER
She was last seen June 2023 over 1-year ago.  I will give her a 3-month supply but she is to follow-up

## 2024-11-29 NOTE — TELEPHONE ENCOUNTER
Requesting   Requested Prescriptions     Pending Prescriptions Disp Refills    WEGOVY 2.4 MG/0.75ML Subcutaneous Solution Auto-injector [Pharmacy Med Name: WEGOVY 2.4 MG/0.75 ML PEN]  0     Sig: INJECT 0.75 ML (2.4 MG TOTAL) INTO THE SKIN ONCE A WEEK.      LOV: 01/11/24  RTC: 3-6mo  Last Relevant Labs:   Filled: 8/30/24 #9ml with 0 refills    No future appointments.

## 2025-04-14 NOTE — TELEPHONE ENCOUNTER
Requesting   Requested Prescriptions     Pending Prescriptions Disp Refills    WEGOVY 2.4 MG/0.75ML Subcutaneous Solution Auto-injector [Pharmacy Med Name: WEGOVY 2.4 MG/0.75 ML PEN]  0     Sig: INJECT 0.75 ML (2.4 MG TOTAL) INTO THE SKIN ONCE A WEEK.      LOV: 1/11/24  RTC: 3-6  Last Relevant Labs:   Filled: 11/29/24 #9ml with 0 refills    No future appointments.

## 2025-05-19 NOTE — ED INITIAL ASSESSMENT (HPI)
Lost insurance in December and started her husbands insurance. Took Rinvoq. Has been off it for 4 months due ot insurance issues. She was scheduled with her RA doctor but they do not take her new insurance. Here with increased chronic pain.

## 2025-05-19 NOTE — DISCHARGE INSTRUCTIONS
Take prednisone as prescribed beginning tomorrow (5/20).  Take Tylenol as needed for pain.  Follow-up with your primary physician as scheduled later this week.  Arrange an appointment with rheumatology as planned.  Return to the emergency department if fever, increasing pain, or other new symptoms develop.
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
## 2025-07-14 NOTE — ED INITIAL ASSESSMENT (HPI)
Last night pt bent over in chair and had syncopal episode falling forward. +hit head. C/o HA, neck soreness, R elbow pain.   Denies numbness/tingling in extremities, dizziness, vision changes. Not on blood thinners. Pt A&Ox4, ambulatory.
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
## 2025-07-14 NOTE — ED QUICK NOTES
Pt verbalizes understanding of discharge paperwork including medications, follow-up care, and education. Pt a/o x4, VSS, NAD, ambulates with steady gait. Pt accompanied by family. PIV removed-catheter intact.

## (undated) DEVICE — UROLOGY DRAIN BAG

## (undated) DEVICE — NITINOL WIRE STR 035

## (undated) DEVICE — GAMMEX® PI HYBRID SIZE 8, STERILE POWDER-FREE SURGICAL GLOVE, POLYISOPRENE AND NEOPRENE BLEND: Brand: GAMMEX

## (undated) DEVICE — SOL H2O 3000ML IRRIG

## (undated) DEVICE — STERILE SURGICAL LUBRICANT, METAL TUBE: Brand: SURGILUBE

## (undated) DEVICE — ISOVUE 300 10X100ML VIAL

## (undated) DEVICE — MEDI-VAC NON-CONDUCTIVE SUCTION TUBING: Brand: CARDINAL HEALTH

## (undated) DEVICE — CATH URET CONE TIP 8FR 138008

## (undated) DEVICE — CYSTO PACK: Brand: MEDLINE INDUSTRIES, INC.

## (undated) DEVICE — SOL H2O 1000ML BTL

## (undated) NOTE — LETTER
23      Patient: Kary Yepez  : 1965 Visit date: 2023    Dear Nirav Rutherford,      I examined your patient in consultation today. She has a traumatic hematoma of the left leg with overlying nonhealing eschar. We will plan for two-stage procedure: Debridement with Silverlon packing, followed by delayed closure or skin grafting, depending upon lax tissue. Thank you for your kind referral. If I may answer any questions, please feel free to contact me.      Sincerely,   Gabrielle Joseph MD     CC: Cheryl Guzman MD

## (undated) NOTE — ED AVS SNAPSHOT
Carmelo Thao   MRN: [id number]    Department:  [facility] Emergency Department   Date of Visit:  4/2/2018           Disclosure     Insurance plans vary and the physician(s) referred by the ER may not be covered by your plan.  Please contact y CARE PHYSICIAN AT ONCE OR RETURN IMMEDIATELY TO THE EMERGENCY DEPARTMENT. If you have been prescribed any medication(s), please fill your prescription right away and begin taking the medication(s) as directed.   If you believe that any of the medications

## (undated) NOTE — LETTER
12/04/19        Carli Nuñez  [place] [id number] [phone]      Dear Kyra Bhardwaj,    [facility] records indicate that you have outstanding lab work and or testing that was ordered for you and has not yet been completed:  Orders Placed This Encounter

## (undated) NOTE — ED AVS SNAPSHOT
Brent Guzman   MRN: [id number]    Department:  [facility] Emergency Department   Date of Visit:  1/7/2020           Disclosure     Insurance plans vary and the physician(s) referred by the ER may not be covered by your plan.  Please contact y CARE PHYSICIAN AT ONCE OR RETURN IMMEDIATELY TO THE EMERGENCY DEPARTMENT. If you have been prescribed any medication(s), please fill your prescription right away and begin taking the medication(s) as directed.   If you believe that any of the medications

## (undated) NOTE — LETTER
23      Patient: Nirali Rios  : 1965 Visit date: 2023    Dear Juan Flores,      I examined your patient in follow-up today. She is 5 weeks post left leg debridement and flap reconstruction. Fortunately we were able to avoid skin grafting. She has postoperatively the flap and incision are well-healed. We will obtain a Medi-stocking for her and continue her use of massage. I have discharged her from care. Thank you for your kind referral. If I may answer any questions, please feel free to contact me.      Sincerely,   Miley Hunt MD     CC: Theresa Orozco MD

## (undated) NOTE — LETTER
Date & Time: 1/7/2020, 11:01 AM  Patient: Peggy Clement  Encounter Provider(s):    Rebecca Bergman MD       To Whom It May Concern:    Peggy Clement was seen and treated in our department on 1/7/2020. She is excused from work for next 2-3 days.     If

## (undated) NOTE — LETTER
01/09/20        [facility]  [place] [phone]      Dear Tom Musa,    Our records indicate that you have outstanding lab work and or testing that was ordered for you and has not yet been completed:  Orders Placed This Encounter